# Patient Record
Sex: FEMALE | Race: ASIAN | NOT HISPANIC OR LATINO | URBAN - METROPOLITAN AREA
[De-identification: names, ages, dates, MRNs, and addresses within clinical notes are randomized per-mention and may not be internally consistent; named-entity substitution may affect disease eponyms.]

---

## 2022-08-25 ENCOUNTER — TELEPHONE (OUTPATIENT)
Dept: SCHEDULING | Facility: IMAGING CENTER | Age: 87
End: 2022-08-25

## 2022-10-19 ENCOUNTER — OFFICE VISIT (OUTPATIENT)
Dept: MEDICAL GROUP | Facility: PHYSICIAN GROUP | Age: 87
End: 2022-10-19
Payer: COMMERCIAL

## 2022-10-19 VITALS
SYSTOLIC BLOOD PRESSURE: 138 MMHG | BODY MASS INDEX: 32.17 KG/M2 | HEIGHT: 55 IN | TEMPERATURE: 97.4 F | DIASTOLIC BLOOD PRESSURE: 70 MMHG | OXYGEN SATURATION: 98 % | RESPIRATION RATE: 16 BRPM | HEART RATE: 75 BPM | WEIGHT: 139 LBS

## 2022-10-19 DIAGNOSIS — I10 ESSENTIAL HYPERTENSION: ICD-10-CM

## 2022-10-19 DIAGNOSIS — N18.4 CKD (CHRONIC KIDNEY DISEASE) STAGE 4, GFR 15-29 ML/MIN (HCC): ICD-10-CM

## 2022-10-19 DIAGNOSIS — R80.9 TYPE 2 DIABETES MELLITUS WITH MICROALBUMINURIA, WITHOUT LONG-TERM CURRENT USE OF INSULIN (HCC): ICD-10-CM

## 2022-10-19 DIAGNOSIS — E11.29 TYPE 2 DIABETES MELLITUS WITH MICROALBUMINURIA, WITHOUT LONG-TERM CURRENT USE OF INSULIN (HCC): ICD-10-CM

## 2022-10-19 DIAGNOSIS — E78.5 DYSLIPIDEMIA: ICD-10-CM

## 2022-10-19 PROCEDURE — 99204 OFFICE O/P NEW MOD 45 MIN: CPT | Performed by: NURSE PRACTITIONER

## 2022-10-19 RX ORDER — ATORVASTATIN CALCIUM 80 MG/1
80 TABLET, FILM COATED ORAL DAILY
COMMUNITY
Start: 2021-11-15 | End: 2022-12-14 | Stop reason: SDUPTHER

## 2022-10-19 RX ORDER — UBIDECARENONE 100 MG
CAPSULE ORAL
COMMUNITY

## 2022-10-19 RX ORDER — AMLODIPINE BESYLATE 10 MG/1
10 TABLET ORAL DAILY
COMMUNITY
Start: 2021-11-22 | End: 2023-03-16 | Stop reason: SDUPTHER

## 2022-10-19 RX ORDER — METOPROLOL SUCCINATE 50 MG/1
50 TABLET, EXTENDED RELEASE ORAL DAILY
COMMUNITY
Start: 2021-11-22 | End: 2022-11-30 | Stop reason: SDUPTHER

## 2022-10-19 RX ORDER — LOSARTAN POTASSIUM 25 MG/1
75 TABLET ORAL DAILY
COMMUNITY
Start: 2021-11-22 | End: 2022-12-06

## 2022-10-19 RX ORDER — GLIPIZIDE 5 MG/1
2.5 TABLET ORAL
COMMUNITY
Start: 2019-04-08 | End: 2022-11-16

## 2022-10-19 RX ORDER — CHOLECALCIFEROL (VITAMIN D3) 25 MCG
TABLET,CHEWABLE ORAL
COMMUNITY

## 2022-10-19 ASSESSMENT — PATIENT HEALTH QUESTIONNAIRE - PHQ9: CLINICAL INTERPRETATION OF PHQ2 SCORE: 0

## 2022-10-19 ASSESSMENT — FIBROSIS 4 INDEX: FIB4 SCORE: 1.41

## 2022-10-19 NOTE — ASSESSMENT & PLAN NOTE
Patient is 86-year-old female here to establish care with me today.  Taking atorvastatin 80 mg daily.  Denies history of stroke or heart disease.

## 2022-10-19 NOTE — ASSESSMENT & PLAN NOTE
Patient is 86-year-old female here to establish care with me today.  Patient reports stage IV chronic kidney disease.  Was seeing nephrology in [place] during the last 6 months that she lives there.  Last GFR is 19.  Scanned into chart.  Also able to review previous nephrology notes in care everywhere.  There was consideration of fistula for possible dialysis.  Patient reports she has been trying to follow a healthy kidney diet.  Requesting referral to nephrologist.

## 2022-10-19 NOTE — ASSESSMENT & PLAN NOTE
Patient is 86-year-old female here to establish care with me today.  Long history of type 2 diabetes with CKD.  Was taking glipizide 5 mg daily up until approximately 6 months ago.  Discontinued taking it because it was causing low blood sugar.  Patient tried taking half a tab, but would still get low blood sugars.  However, since discontinuing it, she has not been monitoring her blood sugar.  Denies any symptoms of hypoglycemia.  Denies polyuria, polydipsia, vision changes.  She brings in copy of most recent lab results from 5/2022, to be scanned into chart.  They show A1c of 6.7%.  Which is when she discontinued glipizide.

## 2022-10-19 NOTE — ASSESSMENT & PLAN NOTE
Patient is 86-year-old female here to establish care with me today.  Patient reports this is chronic health problem that she has had for few years.  Managing with losartan 25 mg daily and metoprolol SR 50 mg daily and amlodipine 10 mg daily.

## 2022-10-24 NOTE — PROGRESS NOTES
CC: Establish care, referral to nephrology    HISTORY OF THE PRESENT ILLNESS: Patient is a 86 y.o. female. This pleasant patient is here today to establish care and for evaluation and management of the following health problems.    Patient recently moved to [place] from [place] to be closer to her granddaughter who is an RN at [employer] and lives in [place].      Type 2 diabetes mellitus with microalbuminuria, without long-term current use of insulin ([facility])  Patient is 86-year-old female here to establish care with me today.  Long history of type 2 diabetes with CKD.  Was taking glipizide 5 mg daily up until approximately 6 months ago.  Discontinued taking it because it was causing low blood sugar.  Patient tried taking half a tab, but would still get low blood sugars.  However, since discontinuing it, she has not been monitoring her blood sugar.  Denies any symptoms of hypoglycemia.  Denies polyuria, polydipsia, vision changes.  She brings in copy of most recent lab results from 5/2022, to be scanned into chart.  They show A1c of 6.7%.  Which is when she discontinued glipizide.    Essential hypertension  Patient is 86-year-old female here to establish care with me today.  Patient reports this is chronic health problem that she has had for few years.  Managing with losartan 25 mg daily and metoprolol SR 50 mg daily and amlodipine 10 mg daily.      Dyslipidemia  Patient is 86-year-old female here to establish care with me today.  Taking atorvastatin 80 mg daily.  Denies history of stroke or heart disease.    CKD (chronic kidney disease) stage 4, GFR 15-29 ml/min ([facility])  Patient is 86-year-old female here to establish care with me today.  Patient reports stage IV chronic kidney disease.  Was seeing nephrology in [place] during the last 6 months that she lives there.  Last GFR is 19.  Scanned into chart.  Also able to review previous nephrology notes in care everywhere.  There was consideration of fistula for  possible dialysis.  Patient reports she has been trying to follow a healthy kidney diet.  Requesting referral to nephrologist.    Allergies: Patient has no known allergies.    Current Outpatient Medications Ordered in Epic   Medication Sig Dispense Refill    amLODIPine (NORVASC) 10 MG Tab Take 10 mg by mouth every day.      atorvastatin (LIPITOR) 80 MG tablet Take 80 mg by mouth every day.      losartan (COZAAR) 25 MG Tab Take 75 mg by mouth every day.      metoprolol SR (TOPROL XL) 50 MG TABLET SR 24 HR Take 50 mg by mouth every day.      BIOTIN 5000 PO Take  by mouth.      Cholecalciferol (D3-1000) 1000 UNIT Cap Take  by mouth.      Cyanocobalamin (B-12) 1000 MCG Cap Take  by mouth.      Omega-3 Fatty Acids (OMEGA 3 PO) Take  by mouth.      Multiple Vitamins-Minerals (WOMENS MULTIVITAMIN PO) Take  by mouth.      glipiZIDE (GLUCOTROL) 5 MG Tab Take 2.5 mg by mouth. (Patient not taking: Reported on 10/19/2022)       No current [facility]-ordered facility-administered medications on file.       Past Medical History:   Diagnosis Date    Arthritis     Blood transfusion without reported diagnosis     Cataract     Diabetes (HCC)     Kidney disease        Past Surgical History:   Procedure Laterality Date    BLADDER SLING FEMALE      CATARACT EXTRACTION WITH IOL         Social History     Tobacco Use    Smoking status: Former     Years: 20.00     Types: Cigarettes     Quit date: 1970     Years since quittin.8    Smokeless tobacco: Never   Vaping Use    Vaping Use: Never used   Substance Use Topics    Alcohol use: Not Currently    Drug use: Never       Family History   Problem Relation Age of Onset    Heart Disease Mother     Stroke Mother     Colon Cancer Father     Hypertension Sister     Heart Disease Brother        ROS:     - Constitutional: Negative for fever, chills, unexpected weight change, and fatigue/generalized weakness.     - HEENT: Negative for headaches, vision changes, hearing changes, ear pain, rhinorrhea,  "sinus congestion, and sore throat.      - Respiratory: Negative for cough, dyspnea, and wheezing.      - Cardiovascular: Negative for chest pain, palpitations, orthopnea, and bilateral lower extremity edema.     - Gastrointestinal: Negative for heartburn, nausea, vomiting, abdominal pain, hematochezia, melena, diarrhea, constipation.     - Genitourinary: Negative for dysuria, polyuria, hematuria, pyuria, urinary urgency.  Positive urinary incontinence.    - Musculoskeletal: Negative for myalgias, back pain, and joint pain.     - Skin: Negative for rash, cyanotic skin color change.  Positive itching    - Neurological: Negative for dizziness, tingling, tremors, focal sensory deficit, focal weakness and headaches.     - Endo/Heme/Allergies: Does not bruise/bleed easily.     - Psychiatric/Behavioral: Negative for depression, suicidal/homicidal ideation and memory loss.         Exam: /70 (BP Location: Left arm)   Pulse 75   Temp 36.3 °C (97.4 °F) (Temporal)   Resp 16   Ht 1.207 m (3' 11.5\")   Wt 63 kg (139 lb)   SpO2 98%  Body mass index is 43.31 kg/m².    General: Alert, pleasant, well nourished, well developed female in NAD  HEENT: Normocephalic. Eyes conjunctiva clear lids without ptosis, pupils equal and reactive to light, ears normal shape and contour, canals are clear bilaterally, tympanic membranes are pearly gray with good light reflex, nasal mucosa without erythema and drainage, oropharynx is without erythema, edema or exudates.   Neck: Supple without bruit. Thyroid is not enlarged.  Pulmonary: Clear to ausculation.  Normal effort. No rales, ronchi, or wheezing.  Cardiovascular: Normal rate and rhythm without murmur. Carotid and radial pulses are intact and equal bilaterally.  No lower extremity edema.  Abdomen: Soft, nontender, nondistended. Normal bowel sounds. Liver and spleen are not palpable  Neurologic: Grossly nonfocal  Lymph: No cervical or supraclavicular lymph nodes are palpable  Skin: Warm " and dry.    Musculoskeletal: Normal gait.   Psych: Normal mood and affect. Alert and oriented. Judgment and insight is normal.    Please note that this dictation was created using voice recognition software. I have made every reasonable attempt to correct obvious errors, but I expect that there are errors of grammar and possibly content that I did not discover before finalizing the note.      Assessment/Plan  1. CKD (chronic kidney disease) stage 4, GFR 15-29 ml/min ([facility])  Patient has stage IV CKD.  Needs to establish with nephrology.  Currently asymptomatic.  Will refer to nephrology and get updated labs.  Patient verbalizes understanding.  - Referral to Nephrology  - URINALYSIS; Future  - PROTEIN/CREAT RATIO URINE; Future    2. Type 2 diabetes mellitus with microalbuminuria, without long-term current use of insulin ([facility])  Uncertain of control.  We will get updated labs.  We will follow-up with patient in 2 weeks.  In the meantime, continue with diabetic diet.  - Comp Metabolic Panel; Future  - ESTIMATED GFR; Future  - HEMOGLOBIN A1C; Future  - MICROALBUMIN CREAT RATIO URINE; Future  - TSH WITH REFLEX TO FT4; Future  - CBC WITH DIFFERENTIAL; Future    3. Dyslipidemia  Uncertain of control.  We will get updated lipid profile.  In the meantime, continue with atorvastatin, no changes.  - Lipid Profile; Future    4. Essential hypertension  Well-controlled.  Continue with losartan, metoprolol, amlodipine, no changes.  Patient not needing refills today.  Advised on lifestyle measures.  - Comp Metabolic Panel; Future  - ESTIMATED GFR; Future  - CBC WITH DIFFERENTIAL; Future    Patient will return to clinic in 2 weeks for lab review and follow-up on chronic health problems or sooner if needed.

## 2022-11-09 ENCOUNTER — HOSPITAL ENCOUNTER (OUTPATIENT)
Dept: LAB | Facility: MEDICAL CENTER | Age: 87
End: 2022-11-09
Attending: NURSE PRACTITIONER
Payer: COMMERCIAL

## 2022-11-09 DIAGNOSIS — N18.4 CKD (CHRONIC KIDNEY DISEASE) STAGE 4, GFR 15-29 ML/MIN (HCC): ICD-10-CM

## 2022-11-09 DIAGNOSIS — E78.5 DYSLIPIDEMIA: ICD-10-CM

## 2022-11-09 DIAGNOSIS — E11.29 TYPE 2 DIABETES MELLITUS WITH MICROALBUMINURIA, WITHOUT LONG-TERM CURRENT USE OF INSULIN (HCC): ICD-10-CM

## 2022-11-09 DIAGNOSIS — I10 ESSENTIAL HYPERTENSION: ICD-10-CM

## 2022-11-09 DIAGNOSIS — R80.9 TYPE 2 DIABETES MELLITUS WITH MICROALBUMINURIA, WITHOUT LONG-TERM CURRENT USE OF INSULIN (HCC): ICD-10-CM

## 2022-11-09 LAB
ALBUMIN SERPL BCP-MCNC: 4 G/DL (ref 3.2–4.9)
ALBUMIN/GLOB SERPL: 1.2 G/DL
ALP SERPL-CCNC: 110 U/L (ref 30–99)
ALT SERPL-CCNC: 15 U/L (ref 2–50)
ANION GAP SERPL CALC-SCNC: 12 MMOL/L (ref 7–16)
APPEARANCE UR: ABNORMAL
AST SERPL-CCNC: 19 U/L (ref 12–45)
BACTERIA #/AREA URNS HPF: ABNORMAL /HPF
BASOPHILS # BLD AUTO: 0.7 % (ref 0–1.8)
BASOPHILS # BLD: 0.05 K/UL (ref 0–0.12)
BILIRUB SERPL-MCNC: 0.3 MG/DL (ref 0.1–1.5)
BILIRUB UR QL STRIP.AUTO: NEGATIVE
BUN SERPL-MCNC: 43 MG/DL (ref 8–22)
CALCIUM SERPL-MCNC: 9.5 MG/DL (ref 8.5–10.5)
CHLORIDE SERPL-SCNC: 105 MMOL/L (ref 96–112)
CHOLEST SERPL-MCNC: 186 MG/DL (ref 100–199)
CO2 SERPL-SCNC: 22 MMOL/L (ref 20–33)
COLOR UR: YELLOW
CREAT SERPL-MCNC: 2.37 MG/DL (ref 0.5–1.4)
CREAT UR-MCNC: 93.25 MG/DL
EOSINOPHIL # BLD AUTO: 0.17 K/UL (ref 0–0.51)
EOSINOPHIL NFR BLD: 2.4 % (ref 0–6.9)
EPI CELLS #/AREA URNS HPF: NEGATIVE /HPF
ERYTHROCYTE [DISTWIDTH] IN BLOOD BY AUTOMATED COUNT: 40.4 FL (ref 35.9–50)
EST. AVERAGE GLUCOSE BLD GHB EST-MCNC: 197 MG/DL
FASTING STATUS PATIENT QL REPORTED: NORMAL
GFR SERPLBLD CREATININE-BSD FMLA CKD-EPI: 20 ML/MIN/1.73 M 2
GLOBULIN SER CALC-MCNC: 3.3 G/DL (ref 1.9–3.5)
GLUCOSE SERPL-MCNC: 161 MG/DL (ref 65–99)
GLUCOSE UR STRIP.AUTO-MCNC: 100 MG/DL
HBA1C MFR BLD: 8.5 % (ref 4–5.6)
HCT VFR BLD AUTO: 35.1 % (ref 37–47)
HDLC SERPL-MCNC: 60 MG/DL
HGB BLD-MCNC: 11.6 G/DL (ref 12–16)
HYALINE CASTS #/AREA URNS LPF: ABNORMAL /LPF
IMM GRANULOCYTES # BLD AUTO: 0.06 K/UL (ref 0–0.11)
IMM GRANULOCYTES NFR BLD AUTO: 0.8 % (ref 0–0.9)
KETONES UR STRIP.AUTO-MCNC: NEGATIVE MG/DL
LDLC SERPL CALC-MCNC: 99 MG/DL
LEUKOCYTE ESTERASE UR QL STRIP.AUTO: ABNORMAL
LYMPHOCYTES # BLD AUTO: 1.89 K/UL (ref 1–4.8)
LYMPHOCYTES NFR BLD: 26.6 % (ref 22–41)
MCH RBC QN AUTO: 29.9 PG (ref 27–33)
MCHC RBC AUTO-ENTMCNC: 33 G/DL (ref 33.6–35)
MCV RBC AUTO: 90.5 FL (ref 81.4–97.8)
MICRO URNS: ABNORMAL
MICROALBUMIN UR-MCNC: >440 MG/DL
MICROALBUMIN/CREAT UR: NORMAL MG/G (ref 0–30)
MONOCYTES # BLD AUTO: 0.94 K/UL (ref 0–0.85)
MONOCYTES NFR BLD AUTO: 13.2 % (ref 0–13.4)
NEUTROPHILS # BLD AUTO: 3.99 K/UL (ref 2–7.15)
NEUTROPHILS NFR BLD: 56.3 % (ref 44–72)
NITRITE UR QL STRIP.AUTO: POSITIVE
NRBC # BLD AUTO: 0 K/UL
NRBC BLD-RTO: 0 /100 WBC
PH UR STRIP.AUTO: 6.5 [PH] (ref 5–8)
PLATELET # BLD AUTO: 302 K/UL (ref 164–446)
PMV BLD AUTO: 9 FL (ref 9–12.9)
POTASSIUM SERPL-SCNC: 4.4 MMOL/L (ref 3.6–5.5)
PROT SERPL-MCNC: 7.3 G/DL (ref 6–8.2)
PROT UR QL STRIP: >=1000 MG/DL
RBC # BLD AUTO: 3.88 M/UL (ref 4.2–5.4)
RBC # URNS HPF: ABNORMAL /HPF
RBC UR QL AUTO: ABNORMAL
SODIUM SERPL-SCNC: 139 MMOL/L (ref 135–145)
SP GR UR STRIP.AUTO: 1.02
TRIGL SERPL-MCNC: 137 MG/DL (ref 0–149)
TSH SERPL DL<=0.005 MIU/L-ACNC: 0.72 UIU/ML (ref 0.38–5.33)
UROBILINOGEN UR STRIP.AUTO-MCNC: 0.2 MG/DL
WBC # BLD AUTO: 7.1 K/UL (ref 4.8–10.8)
WBC #/AREA URNS HPF: ABNORMAL /HPF

## 2022-11-09 PROCEDURE — 36415 COLL VENOUS BLD VENIPUNCTURE: CPT

## 2022-11-09 PROCEDURE — 85025 COMPLETE CBC W/AUTO DIFF WBC: CPT

## 2022-11-09 PROCEDURE — 84156 ASSAY OF PROTEIN URINE: CPT

## 2022-11-09 PROCEDURE — 80061 LIPID PANEL: CPT

## 2022-11-09 PROCEDURE — 83036 HEMOGLOBIN GLYCOSYLATED A1C: CPT

## 2022-11-09 PROCEDURE — 84443 ASSAY THYROID STIM HORMONE: CPT

## 2022-11-09 PROCEDURE — 82043 UR ALBUMIN QUANTITATIVE: CPT

## 2022-11-09 PROCEDURE — 80053 COMPREHEN METABOLIC PANEL: CPT

## 2022-11-09 PROCEDURE — 81001 URINALYSIS AUTO W/SCOPE: CPT

## 2022-11-09 PROCEDURE — 82570 ASSAY OF URINE CREATININE: CPT

## 2022-11-10 ENCOUNTER — TELEPHONE (OUTPATIENT)
Dept: MEDICAL GROUP | Facility: PHYSICIAN GROUP | Age: 87
End: 2022-11-10
Payer: COMMERCIAL

## 2022-11-10 DIAGNOSIS — N30.00 ACUTE CYSTITIS WITHOUT HEMATURIA: ICD-10-CM

## 2022-11-10 LAB
CREAT UR-MCNC: 92.72 MG/DL
PROT UR-MCNC: >600 MG/DL (ref 0–15)
PROT/CREAT UR: ABNORMAL MG/G (ref 10–107)

## 2022-11-10 RX ORDER — SULFAMETHOXAZOLE AND TRIMETHOPRIM 400; 80 MG/1; MG/1
1 TABLET ORAL 2 TIMES DAILY
Qty: 10 TABLET | Refills: 0 | Status: SHIPPED | OUTPATIENT
Start: 2022-11-10 | End: 2022-11-16

## 2022-11-16 ENCOUNTER — OFFICE VISIT (OUTPATIENT)
Dept: MEDICAL GROUP | Facility: PHYSICIAN GROUP | Age: 87
End: 2022-11-16
Payer: COMMERCIAL

## 2022-11-16 VITALS
OXYGEN SATURATION: 98 % | TEMPERATURE: 97.2 F | DIASTOLIC BLOOD PRESSURE: 70 MMHG | RESPIRATION RATE: 16 BRPM | SYSTOLIC BLOOD PRESSURE: 120 MMHG | BODY MASS INDEX: 25.58 KG/M2 | HEIGHT: 60 IN | WEIGHT: 130.3 LBS | HEART RATE: 75 BPM

## 2022-11-16 DIAGNOSIS — M65.332 TRIGGER MIDDLE FINGER OF LEFT HAND: ICD-10-CM

## 2022-11-16 DIAGNOSIS — N18.4 CKD (CHRONIC KIDNEY DISEASE) STAGE 4, GFR 15-29 ML/MIN (HCC): ICD-10-CM

## 2022-11-16 DIAGNOSIS — M54.50 CHRONIC MIDLINE LOW BACK PAIN WITHOUT SCIATICA: ICD-10-CM

## 2022-11-16 DIAGNOSIS — G89.29 CHRONIC MIDLINE LOW BACK PAIN WITHOUT SCIATICA: ICD-10-CM

## 2022-11-16 DIAGNOSIS — R80.9 TYPE 2 DIABETES MELLITUS WITH MICROALBUMINURIA, WITHOUT LONG-TERM CURRENT USE OF INSULIN (HCC): ICD-10-CM

## 2022-11-16 DIAGNOSIS — E11.29 TYPE 2 DIABETES MELLITUS WITH MICROALBUMINURIA, WITHOUT LONG-TERM CURRENT USE OF INSULIN (HCC): ICD-10-CM

## 2022-11-16 DIAGNOSIS — R01.1 HEART MURMUR: ICD-10-CM

## 2022-11-16 PROCEDURE — 99214 OFFICE O/P EST MOD 30 MIN: CPT | Performed by: NURSE PRACTITIONER

## 2022-11-16 RX ORDER — GLIPIZIDE 2.5 MG/1
2.5 TABLET, EXTENDED RELEASE ORAL DAILY
Qty: 30 TABLET | Refills: 2 | Status: SHIPPED | OUTPATIENT
Start: 2022-11-16 | End: 2022-12-06

## 2022-11-16 ASSESSMENT — FIBROSIS 4 INDEX: FIB4 SCORE: 1.36

## 2022-11-16 NOTE — ASSESSMENT & PLAN NOTE
Chronic health problem, uncontrolled.  Patient had discontinued glipizide 5 mg daily several months ago secondary to episodes of hypoglycemia.  She does admit to not being diligent about diabetic diet.  A1c is 8.6%.  Denies polydipsia, polyuria.  Started taking glipizide 2.5 mg immediate release a week ago after she saw the results.  Has been tolerating.  On ARB and statin.

## 2022-11-16 NOTE — ASSESSMENT & PLAN NOTE
Patient reports chronic low back pain.  Seems to worsen in the winter months when it is cold.  Denies leg pain.  Wonders if I have any suggestions for management.

## 2022-11-16 NOTE — PATIENT INSTRUCTIONS
For low back pain, try heating pad a couple times a day and/or over-the-counter Voltaren gel topically.  Try gentle stretching.

## 2022-11-16 NOTE — ASSESSMENT & PLAN NOTE
Patient reports left middle finger pain during the cold weather.  Sometimes gets stuck and unable to release it massages it.  Denies injury, numbness and tingling

## 2022-11-16 NOTE — ASSESSMENT & PLAN NOTE
Chronic problem.  GFR mildly improved to 20, serum creatinine 2.37.  Patient has appointment tomorrow to establish care with nephrology, Dr. Green.  Making clear urine, denies edema.  Not taking any NSAIDs.  Taking losartan.  Patient has symptomatic UTI.  Symptoms have resolved with Bactrim

## 2022-11-16 NOTE — PROGRESS NOTES
CC: Lab review    HISTORY OF THE PRESENT ILLNESS: Patient is a 86 y.o. female. This pleasant patient is here today for evaluation and management of the following health problems.        Type 2 diabetes mellitus with microalbuminuria, without long-term current use of insulin ([facility])  Chronic health problem, uncontrolled.  Patient had discontinued glipizide 5 mg daily several months ago secondary to episodes of hypoglycemia.  She does admit to not being diligent about diabetic diet.  A1c is 8.6%.  Denies polydipsia, polyuria.  Started taking glipizide 2.5 mg immediate release a week ago after she saw the results.  Has been tolerating.  On ARB and statin.    CKD (chronic kidney disease) stage 4, GFR 15-29 ml/min ([facility])  Chronic problem.  GFR mildly improved to 20, serum creatinine 2.37.  Patient has appointment tomorrow to establish care with nephrology, Dr. Green.  Making clear urine, denies edema.  Not taking any NSAIDs.  Taking losartan.  Patient has symptomatic UTI.  Symptoms have resolved with Bactrim    Chronic midline low back pain without sciatica  Patient reports chronic low back pain.  Seems to worsen in the winter months when it is cold.  Denies leg pain.  Wonders if I have any suggestions for management.    Trigger middle finger of left hand  Patient reports left middle finger pain during the cold weather.  Sometimes gets stuck and unable to release it massages it.  Denies injury, numbness and tingling    Heart murmur  Incidental finding on exam today.  Denies chest pain or shortness of breath.    Allergies: Patient has no known allergies.    Current Outpatient Medications Ordered in Epic   Medication Sig Dispense Refill    glipiZIDE SR (GLUCOTROL) 2.5 MG TABLET SR 24 HR Take 1 Tablet by mouth every day. 30 Tablet 2    amLODIPine (NORVASC) 10 MG Tab Take 1 Tablet by mouth every day.      atorvastatin (LIPITOR) 80 MG tablet Take 1 Tablet by mouth every day.      losartan (COZAAR) 25 MG Tab Take 3 Tablets by  "mouth every day.      metoprolol SR (TOPROL XL) 50 MG TABLET SR 24 HR Take 1 Tablet by mouth every day.      BIOTIN 5000 PO Take  by mouth.      Cholecalciferol (D3-1000) 1000 UNIT Cap Take  by mouth.      Cyanocobalamin (B-12) 1000 MCG Cap Take  by mouth.      Omega-3 Fatty Acids (OMEGA 3 PO) Take  by mouth.      Multiple Vitamins-Minerals (WOMENS MULTIVITAMIN PO) Take  by mouth.       No current Epic-ordered facility-administered medications on file.       Past Medical History:   Diagnosis Date    Arthritis     Blood transfusion without reported diagnosis     Cataract     Diabetes (HCC)     Kidney disease        Past Surgical History:   Procedure Laterality Date    BLADDER SLING FEMALE      CATARACT EXTRACTION WITH IOL         Social History     Tobacco Use    Smoking status: Former     Years: 20.00     Types: Cigarettes     Quit date:      Years since quittin.9    Smokeless tobacco: Never   Vaping Use    Vaping Use: Never used   Substance Use Topics    Alcohol use: Not Currently    Drug use: Never       Family History   Problem Relation Age of Onset    Heart Disease Mother     Stroke Mother     Colon Cancer Father     Hypertension Sister     Heart Disease Brother        ROS: As in HPI, otherwise negative for chest pain, dyspnea, abdominal pain, dysuria, blood in stool, fever         Exam: /70 (BP Location: Left arm)   Pulse 75   Temp 36.2 °C (97.2 °F) (Temporal)   Resp 16   Ht 1.511 m (4' 11.5\")   Wt 59.1 kg (130 lb 4.8 oz)   SpO2 98%  Body mass index is 25.88 kg/m².    General: Alert, pleasant, well nourished, well developed female in NAD  Neck: Supple without bruit. Thyroid is not enlarged.  Pulmonary: Clear to ausculation.  Normal effort. No rales, ronchi, or wheezing.  Cardiovascular: Normal rate and rhythm with 2/6 murmur. Carotid and radial pulses are intact and equal bilaterally.  No lower extremity edema.  Neurologic: Grossly nonfocal  Skin: Warm and dry.    Musculoskeletal: Normal " gait.  Lower back without erythema or edema, nontender to palpation.  Right middle finger without erythema or edema, full active range of motion, nontender to palpation  Psych: Normal mood and affect. Alert and oriented. Judgment and insight is normal.    Diabetic Foot Exam: No ulcers or skin lesions present, patient tested with a 10 g force and is sensitive bilaterally throughout the ball of the foot, great toe and heel.  Dorsalis pedis and posterior tibial pulses are present and intact bilaterally    Component      Latest Ref Rn 11/9/2022  8:57 AM   WBC      4.8 - 10.8 K/uL 7.1    RBC      4.20 - 5.40 M/uL 3.88 (L)    Hemoglobin      12.0 - 16.0 g/dL 11.6 (L)    Hematocrit      37.0 - 47.0 % 35.1 (L)    MCV      81.4 - 97.8 fL 90.5    MCH      27.0 - 33.0 pg 29.9    MCHC      33.6 - 35.0 g/dL 33.0 (L)    RDW      35.9 - 50.0 fL 40.4    Platelet Count      164 - 446 K/uL 302    MPV      9.0 - 12.9 fL 9.0    Neutrophils-Polys      44.00 - 72.00 % 56.30    Lymphocytes      22.00 - 41.00 % 26.60    Monocytes      0.00 - 13.40 % 13.20    Eosinophils      0.00 - 6.90 % 2.40    Basophils      0.00 - 1.80 % 0.70    Immature Granulocytes      0.00 - 0.90 % 0.80    Nucleated RBC      /100 WBC 0.00    Neutrophils (Absolute)      2.00 - 7.15 K/uL 3.99    Lymphs (Absolute)      1.00 - 4.80 K/uL 1.89    Monos (Absolute)      0.00 - 0.85 K/uL 0.94 (H)    Eos (Absolute)      0.00 - 0.51 K/uL 0.17    Baso (Absolute)      0.00 - 0.12 K/uL 0.05    Immature Granulocytes (abs)      0.00 - 0.11 K/uL 0.06    NRBC (Absolute)      K/uL 0.00    Sodium      135 - 145 mmol/L 139    Potassium      3.6 - 5.5 mmol/L 4.4    Chloride      96 - 112 mmol/L 105    Co2      20 - 33 mmol/L 22    Anion Gap      7.0 - 16.0  12.0    Glucose      65 - 99 mg/dL 161 (H)    Bun      8 - 22 mg/dL 43 (H)    Creatinine      0.50 - 1.40 mg/dL 2.37 (H)    Calcium      8.5 - 10.5 mg/dL 9.5    AST(SGOT)      12 - 45 U/L 19    ALT(SGPT)      2 - 50 U/L 15     Alkaline Phosphatase      30 - 99 U/L 110 (H)    Total Bilirubin      0.1 - 1.5 mg/dL 0.3    Albumin      3.2 - 4.9 g/dL 4.0    Total Protein      6.0 - 8.2 g/dL 7.3    Globulin      1.9 - 3.5 g/dL 3.3    A-G Ratio      g/dL 1.2    Cholesterol,Tot      100 - 199 mg/dL 186    Triglycerides      0 - 149 mg/dL 137    HDL      >=40 mg/dL 60    LDL      <100 mg/dL 99    Creatinine, Urine      mg/dL    Microalbumin, Urine Random      mg/dL    Micro Alb Creat Ratio      0 - 30 mg/g    Glycohemoglobin      4.0 - 5.6 % 8.5 (H)    Estim. Avg Glu      mg/dL 197    TSH      0.380 - 5.330 uIU/mL 0.720    GFR (CKD-EPI)      >60 mL/min/1.73 m 2 20 !           Please note that this dictation was created using voice recognition software. I have made every reasonable attempt to correct obvious errors, but I expect that there are errors of grammar and possibly content that I did not discover before finalizing the note.      Assessment/Plan  1. Type 2 diabetes mellitus with microalbuminuria, without long-term current use of insulin (HCC)  Uncontrolled.  Has been tolerating immediate release glipizide for the last week.  However, would be better covered with glipizide SR.  Patient will continue immediate release and start sustained-release glipizide.  We will also wait for further recommendations from nephrology.  Due for retinal scan.  No abnormalities on foot exam today.  - glipiZIDE SR (GLUCOTROL) 2.5 MG TABLET SR 24 HR; Take 1 Tablet by mouth every day.  Dispense: 30 Tablet; Refill: 2  - POCT Retinal Eye Exam  - Comp Metabolic Panel; Future  - ESTIMATED GFR; Future  - HEMOGLOBIN A1C; Future    2. CKD (chronic kidney disease) stage 4, GFR 15-29 ml/min (HCC)  Stable.  Asymptomatic.  Has appointment to establish care with nephrology tomorrow.    3. Chronic midline low back pain without sciatica  For low back pain, try heating pad a couple times a day and/or over-the-counter Voltaren gel topically.  Try gentle stretching.    4.  Trigger middle finger of left hand  Reviewed with patient she likely has trigger finger.  Handout on how to manage given to patient today.  If not helpful, consider referral to hand specialist for consideration of steroid injection.    5. Heart murmur  New finding.  Asymptomatic.  Consider echo.    Patient will return to clinic in 3 months for diabetes and lab review or sooner if needed.

## 2022-11-18 ENCOUNTER — APPOINTMENT (OUTPATIENT)
Dept: NEPHROLOGY | Facility: MEDICAL CENTER | Age: 87
End: 2022-11-18
Payer: COMMERCIAL

## 2022-11-29 ENCOUNTER — APPOINTMENT (OUTPATIENT)
Dept: NEPHROLOGY | Facility: MEDICAL CENTER | Age: 87
End: 2022-11-29
Payer: COMMERCIAL

## 2022-11-30 DIAGNOSIS — I10 ESSENTIAL HYPERTENSION: ICD-10-CM

## 2022-11-30 RX ORDER — METOPROLOL SUCCINATE 50 MG/1
50 TABLET, EXTENDED RELEASE ORAL DAILY
Qty: 90 TABLET | Refills: 3 | Status: SHIPPED | OUTPATIENT
Start: 2022-11-30 | End: 2023-07-27 | Stop reason: SDUPTHER

## 2022-11-30 NOTE — TELEPHONE ENCOUNTER
Received request via: Patient    Was the patient seen in the last year in this department? Yes    Does the patient have an active prescription (recently filled or refills available) for medication(s) requested? No    Does the patient have [facility] and need 100 day supply (blood pressure, diabetes and cholesterol meds only)? Patient does not have SCP    Last ov 11/16/22

## 2022-12-06 ENCOUNTER — TELEMEDICINE2 (OUTPATIENT)
Dept: NEPHROLOGY | Facility: MEDICAL CENTER | Age: 87
End: 2022-12-06
Payer: COMMERCIAL

## 2022-12-06 VITALS
HEIGHT: 60 IN | DIASTOLIC BLOOD PRESSURE: 80 MMHG | RESPIRATION RATE: 16 BRPM | OXYGEN SATURATION: 98 % | BODY MASS INDEX: 25.52 KG/M2 | TEMPERATURE: 97.4 F | WEIGHT: 130 LBS | HEART RATE: 83 BPM | SYSTOLIC BLOOD PRESSURE: 140 MMHG

## 2022-12-06 DIAGNOSIS — E21.3 HYPERPARATHYROIDISM (HCC): ICD-10-CM

## 2022-12-06 DIAGNOSIS — E11.21 TYPE 2 DIABETES MELLITUS WITH DIABETIC NEPHROPATHY, WITHOUT LONG-TERM CURRENT USE OF INSULIN (HCC): ICD-10-CM

## 2022-12-06 DIAGNOSIS — I10 ESSENTIAL HYPERTENSION: ICD-10-CM

## 2022-12-06 DIAGNOSIS — N18.4 CKD (CHRONIC KIDNEY DISEASE) STAGE 4, GFR 15-29 ML/MIN (HCC): ICD-10-CM

## 2022-12-06 DIAGNOSIS — D64.9 ANEMIA, UNSPECIFIED TYPE: ICD-10-CM

## 2022-12-06 PROCEDURE — 99204 OFFICE O/P NEW MOD 45 MIN: CPT | Mod: 95 | Performed by: INTERNAL MEDICINE

## 2022-12-06 RX ORDER — LOSARTAN POTASSIUM 100 MG/1
100 TABLET ORAL DAILY
Qty: 90 TABLET | Refills: 3 | Status: SHIPPED | OUTPATIENT
Start: 2022-12-06 | End: 2023-02-28 | Stop reason: SDUPTHER

## 2022-12-06 RX ORDER — DAPAGLIFLOZIN 5 MG/1
5 TABLET, FILM COATED ORAL DAILY
Qty: 90 TABLET | Refills: 3 | Status: SHIPPED | OUTPATIENT
Start: 2022-12-06 | End: 2023-03-15 | Stop reason: SDUPTHER

## 2022-12-06 ASSESSMENT — FIBROSIS 4 INDEX: FIB4 SCORE: 1.36

## 2022-12-06 ASSESSMENT — ENCOUNTER SYMPTOMS
FEVER: 0
ABDOMINAL PAIN: 0
SHORTNESS OF BREATH: 0

## 2022-12-06 NOTE — PROGRESS NOTES
Telemedicine Note     Please note that I could not evaluate the patient in person at the site. All examination and evaluation of the patient and information gathering from the patient and/or the family were done jointly by the requesting physician, CLINT Burns at the site and me via licensed and securely encrypted tele-video communication equipment with the assistance of a trained tele-presenter at the originating site.  As such, my assessments and recommendations are limited as far as such telecommnication allows.     Consulting MD: Franko Green M.D.    Consulting MD location: [place], [place]  Requesting Physician: CLINT Burns   Patient name:      Ivette Castelan  :                    1935   MRN:                   [id number]     Data source:  Patient   Video Time:         30 minutes.   Originating site: [place], [place]  Originating site MA: not available at time of interview     Date of Service: 2022     Chief Complaint:   Chief Complaint   Patient presents with    New Patient     CKD (chronic kidney disease) stage 4, GFR 15-29 ml/min ([facility])        History of Present Illness:    Ivette Castelan is a 86 y.o. female with DM2, HTN, CKD4 who presents today to establish nephrology care.    She moved from [place] in  to [place], [place] to be closer to [facility].     Re: DM2, diagnosed  when she was overweight. Patient has had microalbuminuria since at least , and macroalbuminuria since 2016. Patient has seen an eye doctor and does not have retinopathy. She has had periods of good control of DM and some periods of poor control. She doesn't take glipizide all the time because she gets low sugars.     Re: HTN, diagnosed around . BP control over the years has been fairly controlled. She lost her BP monitor in the move.     Re: CKD, she has been seeing a nephrologist in [place], [place] since . She had 3 pregnancies without pre-eclampsia or DM. She denies history of RYAN, kidney stone. She  denies bladder troubles, but is incontinent. She denies NSAIDs.            ROS:    Review of Systems   Constitutional:  Negative for fever.   Respiratory:  Negative for shortness of breath.    Cardiovascular:  Negative for chest pain and leg swelling.   Gastrointestinal:  Negative for abdominal pain.   Genitourinary:  Negative for dysuria.   All other systems reviewed and are negative.                Past Medical History:  Past Medical History:   Diagnosis Date    Arthritis     Blood transfusion without reported diagnosis     Cataract     Diabetes (HCC)     Kidney disease      Past Surgical History:   Procedure Laterality Date    BLADDER SLING FEMALE      CATARACT EXTRACTION WITH IOL          Current Outpatient Medications   Medication Sig Dispense Refill    metoprolol SR (TOPROL XL) 50 MG TABLET SR 24 HR Take 1 Tablet by mouth every day. 90 Tablet 3    glipiZIDE SR (GLUCOTROL) 2.5 MG TABLET SR 24 HR Take 1 Tablet by mouth every day. 30 Tablet 2    amLODIPine (NORVASC) 10 MG Tab Take 1 Tablet by mouth every day.      atorvastatin (LIPITOR) 80 MG tablet Take 1 Tablet by mouth every day.      losartan (COZAAR) 25 MG Tab Take 3 Tablets by mouth every day.      BIOTIN 5000 PO Take  by mouth.      Cholecalciferol (D3-1000) 1000 UNIT Cap Take  by mouth.      Cyanocobalamin (B-12) 1000 MCG Cap Take  by mouth.      Omega-3 Fatty Acids (OMEGA 3 PO) Take  by mouth.      Multiple Vitamins-Minerals (WOMENS MULTIVITAMIN PO) Take  by mouth.       No current facility-administered medications for this visit.        Social History:  Social History     Socioeconomic History    Marital status:      Spouse name: Not on file    Number of children: Not on file    Years of education: Not on file    Highest education level: Not on file   Occupational History    Not on file   Tobacco Use    Smoking status: Former     Years: 20.00     Types: Cigarettes     Quit date:      Years since quittin.9    Smokeless tobacco: Never    Vaping Use    Vaping Use: Never used   Substance and Sexual Activity    Alcohol use: Not Currently    Drug use: Never    Sexual activity: Not Currently     Partners: Male     Comment:     Other Topics Concern    Not on file   Social History Narrative    Not on file     Social Determinants of Health     Financial Resource Strain: Not on file   Food Insecurity: Not on file   Transportation Needs: Not on file   Physical Activity: Not on file   Stress: Not on file   Social Connections: Not on file   Intimate Partner Violence: Not on file   Housing Stability: Not on file        Family History:  Family History   Problem Relation Age of Onset    Heart Disease Mother     Stroke Mother     Colon Cancer Father     Hypertension Sister     Heart Disease Brother     Kidney Disease Neg Hx         Allergies:   No Known Allergies     Current Outpatient Medications:   (Not in a hospital admission)          Physical Exam:   Vitals:   Vitals:    22 1058   BP: (!) 140/80   BP Location: Left arm   Patient Position: Sitting   BP Cuff Size: Adult   Pulse: 83   Resp: 16   Temp: 36.3 °C (97.4 °F)   TempSrc: Temporal   SpO2: 98%   Weight: 59 kg (130 lb)   Height: 1.524 m (5')       GEN: comfortable, in NAD   HEENT: Pupils unable to check.  Hearings appears normal to finger rubs b/l. Unable to check TMs. Oropharynx or nares could not be checked reliably.   NECK: appears supple, unable to check for thyroid or lymphadenopathy.  CV: skin pink and well perfused   PULM: no increased work of breathing, no wheezes.  ABD: appears soft, nontender, not distended.  Ext: unable to check but RN reports 2+ pedal pulses.  SKIN: No obvious rash or lesion noted.   Psychiatric: normal mood and affect, alert and oriented to self, place, persons and time.           Imaging reviewed & Visualized by me:  DIAGNOSTIC IMAGING REVIEWED AND/OR INTERPRETED BY ME:        Laboratory:   Recent Labs     22  1456 22  1521 22  0857    ALBUMIN 4.2 4.1 4.0   HDL  --   --  60   TRIGLYCERIDE  --   --  137   SODIUM 139 140 139   POTASSIUM 4.9 4.8 4.4   CHLORIDE 106 108 105   CO2 24 22* 22   BUN 63* 55* 43*   CREATININE 2.45*  --  2.37*   PHOSPHORUS 4.7* 4.4  --      Lab Results   Component Value Date/Time    WBC 7.1 11/09/2022 08:57 AM    RBC 3.88 (L) 11/09/2022 08:57 AM    HEMOGLOBIN 11.6 (L) 11/09/2022 08:57 AM    HEMATOCRIT 35.1 (L) 11/09/2022 08:57 AM    MCV 90.5 11/09/2022 08:57 AM    MCH 29.9 11/09/2022 08:57 AM    MCHC 33.0 (L) 11/09/2022 08:57 AM    MPV 9.0 11/09/2022 08:57 AM        URINALYSIS:  Lab Results   Component Value Date/Time    COLORURINE Yellow 11/09/2022 0857    CLARITY Turbid (A) 11/09/2022 0857    SPECGRAVITY 1.020 11/09/2022 0857    PHURINE 6.5 11/09/2022 0857    KETONES Negative 11/09/2022 0857    PROTEINURIN >=1000 (A) 11/09/2022 0857    BILIRUBINUR Negative 11/09/2022 0857    UROBILU 0.2 11/09/2022 0857    NITRITE Positive (A) 11/09/2022 0857    LEUKESTERAS Large (A) 11/09/2022 0857    OCCULTBLOOD Moderate (A) 11/09/2022 0857       UPC  Lab Results   Component Value Date/Time    TOTPROTUR >600.0 (H) 11/09/2022 0857      Lab Results   Component Value Date/Time    CREATININEU 92.72 11/09/2022 0857         [unfilled]       Assessment and Plan:   Ivette Castelan is a 86 y.o. female with DM2, HTN, CKD4 who presents today to establish nephrology care.     1. CKD (chronic kidney disease) stage 4, GFR 15-29 ml/min (HCC)  - Underlying CKD likely from diabetic nephropathy, hypertension, age-related kidney decline.  Patient has had stable CKD stage IV since at least 2021.  I recommend avoid NSAIDs and other nephrotoxins.  I recommend a low-salt diet.  I explained the importance of blood pressure and glycemic control to help slow CKD progression.  Maintain losartan for long-term kidney protection.  Start SGLT2 inhibitor.  Patient is at high risk of CKD progression to ESRD given her macroalbuminuria.    2. Type 2 diabetes mellitus with  diabetic nephropathy, without long-term current use of insulin (HCC)  -Uncontrolled.  Patient complains of hypoglycemia with glipizide.  She is unable to take metformin due to GFR less than 30.  Prescribe SGLT2 inhibitor, Farxiga 5 mg p.o. daily.  Defer further diabetes management to primary care.    3. Essential hypertension  -Uncontrolled.  Increase losartan from 50 to 100 mg p.o. daily.  If hypertension remains uncontrolled, I would recommend starting Lasix 80 mg p.o. daily.    4. Anemia, unspecified type  -Mild.  Check iron studies with next labs.  No acute need for Epogen with hemoglobin over 10.           Return to clinic 3 months with pre-clinic      I spent total of 30 minutes on face-to-face via telemonitor more than half of which was spent coordinating this with patient as well as reviewing the images and labs results with the patient, and answering all questions and explaining in details the assessment and recommendation sections above. The patient expressed their understanding and agreement to the above.        This EM service was conducted utilizing secure and encrypted videoconferencing equipment with the assistance of a trained tele-presenter at the originating site.       Franko Green MD  Nephrology  [facility]

## 2022-12-07 ENCOUNTER — HOSPITAL ENCOUNTER (OUTPATIENT)
Dept: LAB | Facility: MEDICAL CENTER | Age: 87
End: 2022-12-07
Attending: INTERNAL MEDICINE
Payer: COMMERCIAL

## 2022-12-07 DIAGNOSIS — E11.21 TYPE 2 DIABETES MELLITUS WITH DIABETIC NEPHROPATHY, WITHOUT LONG-TERM CURRENT USE OF INSULIN (HCC): ICD-10-CM

## 2022-12-07 DIAGNOSIS — D64.9 ANEMIA, UNSPECIFIED TYPE: ICD-10-CM

## 2022-12-07 DIAGNOSIS — N18.4 CKD (CHRONIC KIDNEY DISEASE) STAGE 4, GFR 15-29 ML/MIN (HCC): ICD-10-CM

## 2022-12-07 DIAGNOSIS — E21.3 HYPERPARATHYROIDISM (HCC): ICD-10-CM

## 2022-12-07 LAB
25(OH)D3 SERPL-MCNC: 27 NG/ML (ref 30–100)
ALBUMIN SERPL BCP-MCNC: 4.2 G/DL (ref 3.2–4.9)
ANION GAP SERPL CALC-SCNC: 10 MMOL/L (ref 7–16)
APPEARANCE UR: CLEAR
BACTERIA #/AREA URNS HPF: NEGATIVE /HPF
BILIRUB UR QL STRIP.AUTO: NEGATIVE
BUN SERPL-MCNC: 52 MG/DL (ref 8–22)
CALCIUM SERPL-MCNC: 9.6 MG/DL (ref 8.5–10.5)
CHLORIDE SERPL-SCNC: 104 MMOL/L (ref 96–112)
CO2 SERPL-SCNC: 24 MMOL/L (ref 20–33)
COLOR UR: YELLOW
CREAT SERPL-MCNC: 2.65 MG/DL (ref 0.5–1.4)
CREAT UR-MCNC: 28.92 MG/DL
EPI CELLS #/AREA URNS HPF: NEGATIVE /HPF
ERYTHROCYTE [DISTWIDTH] IN BLOOD BY AUTOMATED COUNT: 43.5 FL (ref 35.9–50)
FASTING STATUS PATIENT QL REPORTED: NORMAL
FERRITIN SERPL-MCNC: 270 NG/ML (ref 10–291)
GFR SERPLBLD CREATININE-BSD FMLA CKD-EPI: 17 ML/MIN/1.73 M 2
GLUCOSE SERPL-MCNC: 158 MG/DL (ref 65–99)
GLUCOSE UR STRIP.AUTO-MCNC: NEGATIVE MG/DL
HCT VFR BLD AUTO: 34.4 % (ref 37–47)
HGB BLD-MCNC: 11.1 G/DL (ref 12–16)
HYALINE CASTS #/AREA URNS LPF: NORMAL /LPF
IRON SATN MFR SERPL: 31 % (ref 15–55)
IRON SERPL-MCNC: 80 UG/DL (ref 40–170)
KETONES UR STRIP.AUTO-MCNC: NEGATIVE MG/DL
LEUKOCYTE ESTERASE UR QL STRIP.AUTO: NEGATIVE
MCH RBC QN AUTO: 30.4 PG (ref 27–33)
MCHC RBC AUTO-ENTMCNC: 32.3 G/DL (ref 33.6–35)
MCV RBC AUTO: 94.2 FL (ref 81.4–97.8)
MICRO URNS: ABNORMAL
NITRITE UR QL STRIP.AUTO: NEGATIVE
PH UR STRIP.AUTO: 6.5 [PH] (ref 5–8)
PHOSPHATE SERPL-MCNC: 4.1 MG/DL (ref 2.5–4.5)
PLATELET # BLD AUTO: 295 K/UL (ref 164–446)
PMV BLD AUTO: 9.4 FL (ref 9–12.9)
POTASSIUM SERPL-SCNC: 4.8 MMOL/L (ref 3.6–5.5)
PROT UR QL STRIP: 300 MG/DL
PROT UR-MCNC: 233 MG/DL (ref 0–15)
PROT/CREAT UR: 8057 MG/G (ref 10–107)
PTH-INTACT SERPL-MCNC: 163 PG/ML (ref 14–72)
RBC # BLD AUTO: 3.65 M/UL (ref 4.2–5.4)
RBC # URNS HPF: NORMAL /HPF
RBC UR QL AUTO: ABNORMAL
SODIUM SERPL-SCNC: 138 MMOL/L (ref 135–145)
SP GR UR STRIP.AUTO: 1.01
TIBC SERPL-MCNC: 258 UG/DL (ref 250–450)
UIBC SERPL-MCNC: 178 UG/DL (ref 110–370)
UROBILINOGEN UR STRIP.AUTO-MCNC: 0.2 MG/DL
WBC # BLD AUTO: 6.3 K/UL (ref 4.8–10.8)
WBC #/AREA URNS HPF: NORMAL /HPF

## 2022-12-07 PROCEDURE — 82306 VITAMIN D 25 HYDROXY: CPT

## 2022-12-07 PROCEDURE — 85027 COMPLETE CBC AUTOMATED: CPT

## 2022-12-07 PROCEDURE — 84156 ASSAY OF PROTEIN URINE: CPT

## 2022-12-07 PROCEDURE — 83970 ASSAY OF PARATHORMONE: CPT

## 2022-12-07 PROCEDURE — 84100 ASSAY OF PHOSPHORUS: CPT

## 2022-12-07 PROCEDURE — 36415 COLL VENOUS BLD VENIPUNCTURE: CPT

## 2022-12-07 PROCEDURE — 83550 IRON BINDING TEST: CPT

## 2022-12-07 PROCEDURE — 80048 BASIC METABOLIC PNL TOTAL CA: CPT

## 2022-12-07 PROCEDURE — 82728 ASSAY OF FERRITIN: CPT

## 2022-12-07 PROCEDURE — 82570 ASSAY OF URINE CREATININE: CPT | Mod: 91

## 2022-12-07 PROCEDURE — 81001 URINALYSIS AUTO W/SCOPE: CPT

## 2022-12-07 PROCEDURE — 82043 UR ALBUMIN QUANTITATIVE: CPT

## 2022-12-07 PROCEDURE — 83540 ASSAY OF IRON: CPT

## 2022-12-07 PROCEDURE — 82040 ASSAY OF SERUM ALBUMIN: CPT

## 2022-12-08 LAB
CREAT UR-MCNC: 28.96 MG/DL
MICROALBUMIN UR-MCNC: 159.8 MG/DL
MICROALBUMIN/CREAT UR: 5518 MG/G (ref 0–30)

## 2022-12-14 DIAGNOSIS — E78.5 DYSLIPIDEMIA: ICD-10-CM

## 2022-12-14 RX ORDER — ATORVASTATIN CALCIUM 80 MG/1
80 TABLET, FILM COATED ORAL DAILY
Qty: 90 TABLET | Refills: 3 | Status: SHIPPED | OUTPATIENT
Start: 2022-12-14 | End: 2023-07-27 | Stop reason: SDUPTHER

## 2022-12-14 NOTE — TELEPHONE ENCOUNTER
Received request via: Patient    Was the patient seen in the last year in this department? Yes    Does the patient have an active prescription (recently filled or refills available) for medication(s) requested? No    Does the patient have [facility] and need 100 day supply (blood pressure, diabetes and cholesterol meds only)? Patient does not have SCP    Last ov   11/16/22

## 2022-12-16 ENCOUNTER — TELEPHONE (OUTPATIENT)
Dept: HEALTH INFORMATION MANAGEMENT | Facility: OTHER | Age: 87
End: 2022-12-16
Payer: COMMERCIAL

## 2022-12-16 DIAGNOSIS — E55.9 VITAMIN D DEFICIENCY: ICD-10-CM

## 2022-12-16 DIAGNOSIS — Z78.0 POST-MENOPAUSAL: ICD-10-CM

## 2022-12-16 RX ORDER — ERGOCALCIFEROL 1.25 MG/1
50000 CAPSULE ORAL
Qty: 12 CAPSULE | Refills: 0 | Status: SHIPPED | OUTPATIENT
Start: 2022-12-16 | End: 2023-07-18

## 2022-12-16 NOTE — TELEPHONE ENCOUNTER
1. Caller Name: Ivette Castelan                          Call Back Number: [phone] (home)       How would the patient prefer to be contacted with a response: Kizoomhart message    2. SPECIFIC Action To Be Taken: Orders pending, please sign.    3. Diagnosis/Clinical Reason for Request: DEXA screening     4. Specialty & Provider Name/Lab/Imaging Location: [facility]     5. Is appointment scheduled for requested order/referral: no

## 2023-01-26 ENCOUNTER — TELEPHONE (OUTPATIENT)
Dept: MEDICAL GROUP | Facility: PHYSICIAN GROUP | Age: 88
End: 2023-01-26

## 2023-01-26 ENCOUNTER — NON-PROVIDER VISIT (OUTPATIENT)
Dept: MEDICAL GROUP | Facility: PHYSICIAN GROUP | Age: 88
End: 2023-01-26
Payer: COMMERCIAL

## 2023-01-26 VITALS — DIASTOLIC BLOOD PRESSURE: 77 MMHG | SYSTOLIC BLOOD PRESSURE: 142 MMHG

## 2023-01-26 NOTE — PROGRESS NOTES
Ivette Castelan is a 87 y.o. female here for a non-provider visit for Bp Check    Encounter Vitals  Blood Pressure : (!) 142/77 (pt's blood pressure machine)  Our Blood Pressure machine: 130/60    If abnormal, was the Registered Nurse (office provider if RN is unavailable) notified today?No    Routed to PCP/Requested Provider? Yes

## 2023-01-26 NOTE — TELEPHONE ENCOUNTER
Patient came in for a bp check.    In office her BP was 130/60 left arm.  Her machine read 142/77.    Please advise. No symptoms.

## 2023-02-03 ENCOUNTER — HOSPITAL ENCOUNTER (OUTPATIENT)
Dept: LAB | Facility: MEDICAL CENTER | Age: 88
End: 2023-02-03
Attending: NURSE PRACTITIONER
Payer: COMMERCIAL

## 2023-02-03 DIAGNOSIS — R80.9 TYPE 2 DIABETES MELLITUS WITH MICROALBUMINURIA, WITHOUT LONG-TERM CURRENT USE OF INSULIN (HCC): ICD-10-CM

## 2023-02-03 DIAGNOSIS — E11.29 TYPE 2 DIABETES MELLITUS WITH MICROALBUMINURIA, WITHOUT LONG-TERM CURRENT USE OF INSULIN (HCC): ICD-10-CM

## 2023-02-03 LAB
ALBUMIN SERPL BCP-MCNC: 4.3 G/DL (ref 3.2–4.9)
ALBUMIN/GLOB SERPL: 1.3 G/DL
ALP SERPL-CCNC: 95 U/L (ref 30–99)
ALT SERPL-CCNC: 20 U/L (ref 2–50)
ANION GAP SERPL CALC-SCNC: 11 MMOL/L (ref 7–16)
AST SERPL-CCNC: 23 U/L (ref 12–45)
BILIRUB SERPL-MCNC: 0.3 MG/DL (ref 0.1–1.5)
BUN SERPL-MCNC: 53 MG/DL (ref 8–22)
CALCIUM ALBUM COR SERPL-MCNC: 9.3 MG/DL (ref 8.5–10.5)
CALCIUM SERPL-MCNC: 9.5 MG/DL (ref 8.5–10.5)
CHLORIDE SERPL-SCNC: 108 MMOL/L (ref 96–112)
CO2 SERPL-SCNC: 23 MMOL/L (ref 20–33)
CREAT SERPL-MCNC: 3.14 MG/DL (ref 0.5–1.4)
EST. AVERAGE GLUCOSE BLD GHB EST-MCNC: 177 MG/DL
FASTING STATUS PATIENT QL REPORTED: NORMAL
GFR SERPLBLD CREATININE-BSD FMLA CKD-EPI: 14 ML/MIN/1.73 M 2
GLOBULIN SER CALC-MCNC: 3.2 G/DL (ref 1.9–3.5)
GLUCOSE SERPL-MCNC: 145 MG/DL (ref 65–99)
HBA1C MFR BLD: 7.8 % (ref 4–5.6)
POTASSIUM SERPL-SCNC: 5 MMOL/L (ref 3.6–5.5)
PROT SERPL-MCNC: 7.5 G/DL (ref 6–8.2)
SODIUM SERPL-SCNC: 142 MMOL/L (ref 135–145)

## 2023-02-03 PROCEDURE — 36415 COLL VENOUS BLD VENIPUNCTURE: CPT

## 2023-02-03 PROCEDURE — 80053 COMPREHEN METABOLIC PANEL: CPT

## 2023-02-03 PROCEDURE — 83036 HEMOGLOBIN GLYCOSYLATED A1C: CPT

## 2023-02-16 ENCOUNTER — OFFICE VISIT (OUTPATIENT)
Dept: MEDICAL GROUP | Facility: PHYSICIAN GROUP | Age: 88
End: 2023-02-16
Payer: COMMERCIAL

## 2023-02-16 VITALS
TEMPERATURE: 97.1 F | SYSTOLIC BLOOD PRESSURE: 136 MMHG | RESPIRATION RATE: 18 BRPM | WEIGHT: 126.6 LBS | HEIGHT: 60 IN | BODY MASS INDEX: 24.85 KG/M2 | HEART RATE: 69 BPM | OXYGEN SATURATION: 98 % | DIASTOLIC BLOOD PRESSURE: 60 MMHG

## 2023-02-16 DIAGNOSIS — J34.89 RHINORRHEA: ICD-10-CM

## 2023-02-16 DIAGNOSIS — E11.21 TYPE 2 DIABETES MELLITUS WITH DIABETIC NEPHROPATHY, WITHOUT LONG-TERM CURRENT USE OF INSULIN (HCC): ICD-10-CM

## 2023-02-16 DIAGNOSIS — N18.4 CKD (CHRONIC KIDNEY DISEASE) STAGE 4, GFR 15-29 ML/MIN (HCC): ICD-10-CM

## 2023-02-16 PROCEDURE — 99214 OFFICE O/P EST MOD 30 MIN: CPT | Performed by: NURSE PRACTITIONER

## 2023-02-16 RX ORDER — AZELASTINE 1 MG/ML
1 SPRAY, METERED NASAL 2 TIMES DAILY PRN
Qty: 30 ML | Refills: 1 | Status: SHIPPED | OUTPATIENT
Start: 2023-02-16 | End: 2023-07-18

## 2023-02-16 ASSESSMENT — PATIENT HEALTH QUESTIONNAIRE - PHQ9: CLINICAL INTERPRETATION OF PHQ2 SCORE: 0

## 2023-02-16 ASSESSMENT — FIBROSIS 4 INDEX: FIB4 SCORE: 1.48

## 2023-02-16 NOTE — PROGRESS NOTES
CC: Lab review, runny nose, episode of racing heart at night    HISTORY OF THE PRESENT ILLNESS: Patient is a 87 y.o. female. This pleasant patient is here today for evaluation and management of the following health problems.      Type 2 diabetes mellitus with diabetic nephropathy, without long-term current use of insulin ([facility])  This is a chronic health problem that is well controlled with current medications and lifestyle measures.  A1c is 7.8%.  Now taking Farxiga 5 mg daily.  Fasting blood sugars have been in the 120s.  On statin and ARB.  Denies polydipsia, polyuria, vision changes.  Due for retinal scan.  Previous point-of-care retinal scan unsuccessful.    Rhinorrhea  Patient reports rhinorrhea since heating and replaced in her home.  Will trickle down her throat at night and make her cough.  Reports episode of waking startled when night and her heart was racing, dreamed she was falling.  Only happened 1 time, no associated chest pain or shortness of breath.    CKD (chronic kidney disease) stage 4, GFR 15-29 ml/min ([facility])  Chronic problem.  Now managed by nephrology.  Taking Farxiga 5 mg daily and losartan 100 mg daily.  Recent labs show increase in serum creatinine and mild decrease in GFR.  Patient is concerned and would like me to message nephrology.  Not taking NSAIDs.    Allergies: Patient has no known allergies.    Current Outpatient Medications Ordered in Epic   Medication Sig Dispense Refill    azelastine (ASTELIN) 137 MCG/SPRAY nasal spray Administer 1 Spray into affected nostril(S) 2 times a day as needed for Rhinitis. 30 mL 1    vitamin D2, Ergocalciferol, (DRISDOL) 1.25 MG (76045 UT) Cap capsule Take 1 Capsule by mouth every 7 days. 12 Capsule 0    atorvastatin (LIPITOR) 80 MG tablet Take 1 Tablet by mouth every day. 90 Tablet 3    losartan (COZAAR) 100 MG Tab Take 1 Tablet by mouth every day. 90 Tablet 3    dapagliflozin propanediol (FARXIGA) 5 MG Tab Take 1 Tablet by mouth every day. 90 Tablet 3     metoprolol SR (TOPROL XL) 50 MG TABLET SR 24 HR Take 1 Tablet by mouth every day. 90 Tablet 3    amLODIPine (NORVASC) 10 MG Tab Take 1 Tablet by mouth every day.      BIOTIN 5000 PO Take  by mouth.      Cholecalciferol (D3-1000) 1000 UNIT Cap Take  by mouth.      Cyanocobalamin (B-12) 1000 MCG Cap Take  by mouth.      Omega-3 Fatty Acids (OMEGA 3 PO) Take  by mouth.      Multiple Vitamins-Minerals (WOMENS MULTIVITAMIN PO) Take  by mouth.       No current Epic-ordered facility-administered medications on file.       Past Medical History:   Diagnosis Date    Arthritis     Blood transfusion without reported diagnosis     Cataract     Diabetes (HCC)     Kidney disease        Past Surgical History:   Procedure Laterality Date    BLADDER SLING FEMALE      CATARACT EXTRACTION WITH IOL         Social History     Tobacco Use    Smoking status: Former     Years: 20.00     Types: Cigarettes     Quit date:      Years since quittin.1    Smokeless tobacco: Never   Vaping Use    Vaping Use: Never used   Substance Use Topics    Alcohol use: Not Currently    Drug use: Never       Family History   Problem Relation Age of Onset    Heart Disease Mother     Stroke Mother     Colon Cancer Father     Hypertension Sister     Heart Disease Brother     Kidney Disease Neg Hx        ROS:As in HPI, otherwise negative for chest pain, dyspnea, abdominal pain, dysuria, blood in stool, fever          Exam: /60 (BP Location: Left arm)   Pulse 69   Temp 36.2 °C (97.1 °F) (Temporal)   Resp 18   Ht 1.524 m (5')   Wt 57.4 kg (126 lb 9.6 oz)   SpO2 98%  Body mass index is 24.72 kg/m².    General: Alert, delightful, well nourished, well developed female in NAD  HEENT: Normocephalic. Eyes conjunctiva clear lids without ptosis, pupils equal and reactive to light, ears normal shape and contour, canals are clear bilaterally, nasal mucosa without erythema and drainage, oropharynx is without erythema, edema or exudates.   Neck: Supple  without bruit. Thyroid is not enlarged.  Pulmonary: Clear to ausculation.  Normal effort. No rales, ronchi, or wheezing.  Cardiovascular: Normal rate and rhythm without murmur.  No lower extremity edema.  Neurologic: Grossly nonfocal  Lymph: No cervical or supraclavicular lymph nodes are palpable  Skin: Warm and dry.    Psych: Normal mood and affect. Alert and oriented. Judgment and insight is normal.    Component      Latest Ref Rng 2/3/2023   Sodium      135 - 145 mmol/L 142    Potassium      3.6 - 5.5 mmol/L 5.0    Chloride      96 - 112 mmol/L 108    Co2      20 - 33 mmol/L 23    Anion Gap      7.0 - 16.0  11.0    Glucose      65 - 99 mg/dL 145 (H)    Bun      8 - 22 mg/dL 53 (H)    Creatinine      0.50 - 1.40 mg/dL 3.14 (H)    Calcium      8.5 - 10.5 mg/dL 9.5    AST(SGOT)      12 - 45 U/L 23    ALT(SGPT)      2 - 50 U/L 20    Alkaline Phosphatase      30 - 99 U/L 95    Total Bilirubin      0.1 - 1.5 mg/dL 0.3    Albumin      3.2 - 4.9 g/dL 4.3    Total Protein      6.0 - 8.2 g/dL 7.5    Globulin      1.9 - 3.5 g/dL 3.2    A-G Ratio      g/dL 1.3    Glycohemoglobin      4.0 - 5.6 % 7.8 (H)    Estim. Avg Glu      mg/dL 177    GFR (CKD-EPI)      >60 mL/min/1.73 m 2 14 !    Fasting Status Fasting    Correct Calcium      8.5 - 10.5 mg/dL 9.3           Please note that this dictation was created using voice recognition software. I have made every reasonable attempt to correct obvious errors, but I expect that there are errors of grammar and possibly content that I did not discover before finalizing the note.      Assessment/Plan  1. Type 2 diabetes mellitus with diabetic nephropathy, without long-term current use of insulin (HCC)  Well-controlled.  Continue with Farxiga.  Continue with lifestyle measures.  Will refer to optometry for retinal scan.  - Referral to Optometry    2. Rhinorrhea  We will trial Astelin nasal spray.  Advised to elevate head at night.  Reviewed with patient that her dream may have been stress  related or may be had mild apneic episode.  She will continue to monitor.  - azelastine (ASTELIN) 137 MCG/SPRAY nasal spray; Administer 1 Spray into affected nostril(S) 2 times a day as needed for Rhinitis.  Dispense: 30 mL; Refill: 1    3. CKD (chronic kidney disease) stage 4, GFR 15-29 ml/min ([facility])  Has upcoming appointment with nephrology next month.  We will reach out to nephrology to see if they would like to do repeat labs prior to her appointment with him.    Patient will return to clinic in 3 months or sooner if needed.  We will get point-of-care A1c at next appointment.      My total time spent caring for the patient on the day of the encounter was 31 minutes.   This does not include time spent on separately billable procedures/tests.

## 2023-02-17 NOTE — ASSESSMENT & PLAN NOTE
Chronic problem.  Now managed by nephrology.  Taking Farxiga 5 mg daily and losartan 100 mg daily.  Recent labs show increase in serum creatinine and mild decrease in GFR.  Patient is concerned and would like me to message nephrology.  Not taking NSAIDs.

## 2023-02-17 NOTE — ASSESSMENT & PLAN NOTE
This is a chronic health problem that is well controlled with current medications and lifestyle measures.  A1c is 7.8%.  Now taking Farxiga 5 mg daily.  Fasting blood sugars have been in the 120s.  On statin and ARB.  Denies polydipsia, polyuria, vision changes.  Due for retinal scan.  Previous point-of-care retinal scan unsuccessful.

## 2023-02-17 NOTE — ASSESSMENT & PLAN NOTE
Patient reports rhinorrhea since heating and replaced in her home.  Will trickle down her throat at night and make her cough.  Reports episode of waking startled when night and her heart was racing, dreamed she was falling.  Only happened 1 time, no associated chest pain or shortness of breath.

## 2023-02-20 ENCOUNTER — TELEPHONE (OUTPATIENT)
Dept: MEDICAL GROUP | Facility: PHYSICIAN GROUP | Age: 88
End: 2023-02-20
Payer: COMMERCIAL

## 2023-02-20 DIAGNOSIS — N18.4 CKD (CHRONIC KIDNEY DISEASE) STAGE 4, GFR 15-29 ML/MIN (HCC): ICD-10-CM

## 2023-02-28 RX ORDER — LOSARTAN POTASSIUM 100 MG/1
100 TABLET ORAL DAILY
Qty: 90 TABLET | Refills: 3 | Status: SHIPPED | OUTPATIENT
Start: 2023-02-28 | End: 2023-05-19 | Stop reason: SDUPTHER

## 2023-03-09 ENCOUNTER — HOSPITAL ENCOUNTER (OUTPATIENT)
Dept: LAB | Facility: MEDICAL CENTER | Age: 88
End: 2023-03-09
Attending: NURSE PRACTITIONER
Payer: COMMERCIAL

## 2023-03-09 DIAGNOSIS — N18.4 CKD (CHRONIC KIDNEY DISEASE) STAGE 4, GFR 15-29 ML/MIN (HCC): ICD-10-CM

## 2023-03-09 LAB
ALBUMIN SERPL BCP-MCNC: 4.2 G/DL (ref 3.2–4.9)
APPEARANCE UR: CLEAR
BACTERIA #/AREA URNS HPF: NEGATIVE /HPF
BILIRUB UR QL STRIP.AUTO: NEGATIVE
BUN SERPL-MCNC: 46 MG/DL (ref 8–22)
CALCIUM ALBUM COR SERPL-MCNC: 9.1 MG/DL (ref 8.5–10.5)
CALCIUM SERPL-MCNC: 9.3 MG/DL (ref 8.5–10.5)
CHLORIDE SERPL-SCNC: 104 MMOL/L (ref 96–112)
CO2 SERPL-SCNC: 24 MMOL/L (ref 20–33)
COLOR UR: YELLOW
CREAT SERPL-MCNC: 2.94 MG/DL (ref 0.5–1.4)
CREAT UR-MCNC: 87.87 MG/DL
EPI CELLS #/AREA URNS HPF: ABNORMAL /HPF
FASTING STATUS PATIENT QL REPORTED: NORMAL
GFR SERPLBLD CREATININE-BSD FMLA CKD-EPI: 15 ML/MIN/1.73 M 2
GLUCOSE SERPL-MCNC: 137 MG/DL (ref 65–99)
GLUCOSE UR STRIP.AUTO-MCNC: 500 MG/DL
HYALINE CASTS #/AREA URNS LPF: ABNORMAL /LPF
KETONES UR STRIP.AUTO-MCNC: NEGATIVE MG/DL
LEUKOCYTE ESTERASE UR QL STRIP.AUTO: ABNORMAL
MICRO URNS: ABNORMAL
NITRITE UR QL STRIP.AUTO: NEGATIVE
PH UR STRIP.AUTO: 7 [PH] (ref 5–8)
PHOSPHATE SERPL-MCNC: 4.1 MG/DL (ref 2.5–4.5)
POTASSIUM SERPL-SCNC: 4.7 MMOL/L (ref 3.6–5.5)
PROT UR QL STRIP: 300 MG/DL
PROT UR-MCNC: 320 MG/DL (ref 0–15)
PROT/CREAT UR: 3642 MG/G (ref 10–107)
RBC # URNS HPF: ABNORMAL /HPF
RBC UR QL AUTO: NEGATIVE
RENAL EPI CELLS #/AREA URNS HPF: ABNORMAL /HPF
SODIUM SERPL-SCNC: 138 MMOL/L (ref 135–145)
SP GR UR STRIP.AUTO: 1.02
TRANS CELLS #/AREA URNS HPF: ABNORMAL /HPF
UROBILINOGEN UR STRIP.AUTO-MCNC: 0.2 MG/DL
WBC #/AREA URNS HPF: ABNORMAL /HPF

## 2023-03-09 PROCEDURE — 84156 ASSAY OF PROTEIN URINE: CPT

## 2023-03-09 PROCEDURE — 81001 URINALYSIS AUTO W/SCOPE: CPT

## 2023-03-09 PROCEDURE — 80069 RENAL FUNCTION PANEL: CPT

## 2023-03-09 PROCEDURE — 36415 COLL VENOUS BLD VENIPUNCTURE: CPT

## 2023-03-09 PROCEDURE — 82570 ASSAY OF URINE CREATININE: CPT

## 2023-03-09 PROCEDURE — 82043 UR ALBUMIN QUANTITATIVE: CPT

## 2023-03-10 LAB
CREAT UR-MCNC: 88.62 MG/DL
MICROALBUMIN UR-MCNC: >40 MG/DL
MICROALBUMIN/CREAT UR: 451 MG/G (ref 0–30)

## 2023-03-15 DIAGNOSIS — N18.4 CKD (CHRONIC KIDNEY DISEASE) STAGE 4, GFR 15-29 ML/MIN (HCC): ICD-10-CM

## 2023-03-15 DIAGNOSIS — E11.21 TYPE 2 DIABETES MELLITUS WITH DIABETIC NEPHROPATHY, WITHOUT LONG-TERM CURRENT USE OF INSULIN (HCC): ICD-10-CM

## 2023-03-16 RX ORDER — AMLODIPINE BESYLATE 10 MG/1
10 TABLET ORAL DAILY
Qty: 90 TABLET | Refills: 3 | Status: SHIPPED | OUTPATIENT
Start: 2023-03-16 | End: 2023-07-27 | Stop reason: SDUPTHER

## 2023-03-16 RX ORDER — DAPAGLIFLOZIN 5 MG/1
5 TABLET, FILM COATED ORAL DAILY
Qty: 90 TABLET | Refills: 3 | Status: SHIPPED | OUTPATIENT
Start: 2023-03-16 | End: 2023-05-22 | Stop reason: SDUPTHER

## 2023-03-16 NOTE — TELEPHONE ENCOUNTER
Received request via: Patient    Was the patient seen in the last year in this department? Yes    Does the patient have an active prescription (recently filled or refills available) for medication(s) requested? No    Does the patient have [facility] and need 100 day supply (blood pressure, diabetes and cholesterol meds only)? Patient does not have SCP    Last ov 2/16/23

## 2023-03-17 ENCOUNTER — TELEMEDICINE2 (OUTPATIENT)
Dept: NEPHROLOGY | Facility: MEDICAL CENTER | Age: 88
End: 2023-03-17
Payer: COMMERCIAL

## 2023-03-17 VITALS
TEMPERATURE: 97.3 F | DIASTOLIC BLOOD PRESSURE: 90 MMHG | WEIGHT: 123 LBS | OXYGEN SATURATION: 97 % | HEART RATE: 86 BPM | HEIGHT: 60 IN | SYSTOLIC BLOOD PRESSURE: 128 MMHG | RESPIRATION RATE: 14 BRPM | BODY MASS INDEX: 24.15 KG/M2

## 2023-03-17 DIAGNOSIS — E55.9 VITAMIN D DEFICIENCY: ICD-10-CM

## 2023-03-17 DIAGNOSIS — N18.4 CKD (CHRONIC KIDNEY DISEASE) STAGE 4, GFR 15-29 ML/MIN (HCC): ICD-10-CM

## 2023-03-17 DIAGNOSIS — D64.9 ANEMIA, UNSPECIFIED TYPE: ICD-10-CM

## 2023-03-17 DIAGNOSIS — E11.21 TYPE 2 DIABETES MELLITUS WITH DIABETIC NEPHROPATHY, WITHOUT LONG-TERM CURRENT USE OF INSULIN (HCC): ICD-10-CM

## 2023-03-17 DIAGNOSIS — I10 ESSENTIAL HYPERTENSION: ICD-10-CM

## 2023-03-17 PROCEDURE — 99214 OFFICE O/P EST MOD 30 MIN: CPT | Mod: 95 | Performed by: INTERNAL MEDICINE

## 2023-03-17 ASSESSMENT — ENCOUNTER SYMPTOMS
SHORTNESS OF BREATH: 0
ABDOMINAL PAIN: 0
FEVER: 0

## 2023-03-17 ASSESSMENT — FIBROSIS 4 INDEX: FIB4 SCORE: 1.48

## 2023-03-17 NOTE — PROGRESS NOTES
Telemedicine Note     Please note that I could not evaluate the patient in person at the site. All examination and evaluation of the patient and information gathering from the patient and/or the family were done jointly by the requesting physician, CLINT Burns at the site and me via licensed and securely encrypted tele-video communication equipment with the assistance of a trained tele-presenter at the originating site.  As such, my assessments and recommendations are limited as far as such telecommnication allows.     Consulting MD: Franko Green M.D.    Consulting MD location: [place], [place]  Requesting Physician: CLINT Burns   Patient name:      Ivette Castelan  :                    1935   MRN:                   [id number]     Data source:  Patient   Video Time:         30 minutes.   Originating site: [place], [place]  Originating site MA: [place]     Date of Service: 3/17/2023      Chief Complaint:   Chief Complaint   Patient presents with    Follow-Up      CKD (chronic kidney disease) stage 4, GFR 15-29 ml/min (HCC            History of Present Illness:    Ivette Castelan is a 87 y.o. female with DM2, HTN, CKD4 who presents today for follow-up.    She moved from [place] in  to [place], [place] to be closer to family.   She has lost some weight.  She also complains of fatigue with exertion.     Re: DM2, diagnosed  when she was overweight. Patient has had microalbuminuria since at least 2010, and macroalbuminuria since 2016. Patient has seen an eye doctor and does not have retinopathy. She has had periods of good control of DM and some periods of poor control. She stopped glipizide and is taking farxiga. She is tolerating well. Says her sugars are usually 135-140.     Re: HTN, diagnosed around . BP control over the years has been fairly controlled. She is checking her BP at home now, usually 120's / 60's. She denies LE edema.     Re: CKD, she has been seeing a nephrologist in  [place], [place] since 2022. She had 3 pregnancies without pre-eclampsia or DM. She denies history of RYAN, kidney stone. She denies bladder troubles, but is incontinent. She denies NSAIDs. Her appetite is \"very good.\" Denies headache, N/V. Denies metallic taste.            ROS:    Review of Systems   Constitutional:  Negative for fever.   Respiratory:  Negative for shortness of breath.    Cardiovascular:  Negative for chest pain.   Gastrointestinal:  Negative for abdominal pain.   Genitourinary:  Negative for dysuria.   All other systems reviewed and are negative.                Past Medical History:  Past Medical History:   Diagnosis Date    Arthritis     Blood transfusion without reported diagnosis     Cataract     Diabetes (HCC)     Kidney disease      Past Surgical History:   Procedure Laterality Date    BLADDER SLING FEMALE      CATARACT EXTRACTION WITH IOL          Current Outpatient Medications   Medication Sig Dispense Refill    dapagliflozin propanediol (FARXIGA) 5 MG Tab Take 1 Tablet by mouth every day. 90 Tablet 3    amLODIPine (NORVASC) 10 MG Tab Take 1 Tablet by mouth every day. 90 Tablet 3    losartan (COZAAR) 100 MG Tab Take 1 Tablet by mouth every day. 90 Tablet 3    azelastine (ASTELIN) 137 MCG/SPRAY nasal spray Administer 1 Spray into affected nostril(S) 2 times a day as needed for Rhinitis. 30 mL 1    vitamin D2, Ergocalciferol, (DRISDOL) 1.25 MG (85335 UT) Cap capsule Take 1 Capsule by mouth every 7 days. 12 Capsule 0    atorvastatin (LIPITOR) 80 MG tablet Take 1 Tablet by mouth every day. 90 Tablet 3    metoprolol SR (TOPROL XL) 50 MG TABLET SR 24 HR Take 1 Tablet by mouth every day. 90 Tablet 3    BIOTIN 5000 PO Take  by mouth.      Cholecalciferol (D3-1000) 1000 UNIT Cap Take  by mouth.      Cyanocobalamin (B-12) 1000 MCG Cap Take  by mouth.      Omega-3 Fatty Acids (OMEGA 3 PO) Take  by mouth.      Multiple Vitamins-Minerals (WOMENS MULTIVITAMIN PO) Take  by mouth.       No current " facility-administered medications for this visit.               Allergies:   No Known Allergies     Current Outpatient Medications:   (Not in a hospital admission)            Physical Exam:   Vitals:   Vitals:    03/17/23 1054   BP: (!) 128/90   Pulse: 86   Resp: 14   Temp: 36.3 °C (97.3 °F)   SpO2: 97%   Weight: 55.8 kg (123 lb)   Height: 1.524 m (5')       Physical Exam:  Constitutional: Alert, no distress, well-groomed.  Skin: No rashes in visible areas.  Eye: Round. Conjunctiva clear, lids normal. No icterus.   ENMT: Lips pink without lesions, good dentition, moist mucous membranes. Phonation normal.  Neck: No masses, no thyromegaly. Moves freely without pain.  Respiratory: Unlabored respiratory effort, no cough or audible wheeze. No crankles per site MA.   CV: heart rhythm regular with no murmurs per site MA. No LE edema per site MA.   Psych: Alert and oriented x3, normal affect and mood.           Imaging reviewed & Visualized by me:  DIAGNOSTIC IMAGING REVIEWED AND/OR INTERPRETED BY ME:        Laboratory:   Recent Labs     05/27/22  1521 11/09/22  0857 11/09/22  0857 12/07/22  1045 02/03/23  1023 03/09/23  1133   ALBUMIN 4.1 4.0   < > 4.2 4.3 4.2   HDL  --  60  --   --   --   --    TRIGLYCERIDE  --  137  --   --   --   --    SODIUM 140 139   < > 138 142 138   POTASSIUM 4.8 4.4   < > 4.8 5.0 4.7   CHLORIDE 108 105   < > 104 108 104   CO2 22* 22   < > 24 23 24   BUN 55* 43*   < > 52* 53* 46*   CREATININE  --  2.37*   < > 2.65* 3.14* 2.94*   PHOSPHORUS 4.4  --   --  4.1  --  4.1    < > = values in this interval not displayed.     Lab Results   Component Value Date/Time    WBC 6.3 12/07/2022 10:45 AM    RBC 3.65 (L) 12/07/2022 10:45 AM    HEMOGLOBIN 11.1 (L) 12/07/2022 10:45 AM    HEMATOCRIT 34.4 (L) 12/07/2022 10:45 AM    MCV 94.2 12/07/2022 10:45 AM    MCH 30.4 12/07/2022 10:45 AM    MCHC 32.3 (L) 12/07/2022 10:45 AM    MPV 9.4 12/07/2022 10:45 AM        URINALYSIS:  Lab Results   Component Value Date/Time     COLORURINE Yellow 03/09/2023 1134    CLARITY Clear 03/09/2023 1134    SPECGRAVITY 1.021 03/09/2023 1134    PHURINE 7.0 03/09/2023 1134    KETONES Negative 03/09/2023 1134    PROTEINURIN 300 (A) 03/09/2023 1134    BILIRUBINUR Negative 03/09/2023 1134    UROBILU 0.2 03/09/2023 1134    NITRITE Negative 03/09/2023 1134    LEUKESTERAS Small (A) 03/09/2023 1134    OCCULTBLOOD Negative 03/09/2023 1134       [facility]  Lab Results   Component Value Date/Time    TOTPROTUR 320.0 (H) 03/09/2023 1134      Lab Results   Component Value Date/Time    CREATININEU 87.87 03/09/2023 1134           [unfilled]       Assessment and Plan:   Ivette Castelan is a 86 y.o. female with DM2, HTN, CKD4 who presents today to establish nephrology care.     1. CKD (chronic kidney disease) stage 4, GFR 15-29 ml/min ([facility])  -Creatinine and GFR remain in stage IV, but flirting with stage V.  Underlying CKD likely from diabetic nephropathy, hypertension, age-related kidney decline. Avoid NSAIDs and other nephrotoxins.  Recommend low-sodium diet. I explained the importance of glycemic and blood pressure control to help slow CKD progression.  Maintain losartan for long-term kidney protection, as well as SGLT2 inhibitor.  Patient is at high risk of CKD progression to ESRD given her macroalbuminuria.  Patient thinks she would try hemodialysis.  Given her advanced age, I would recommend time-limited trial of hemodialysis via tunneled dialysis catheter whenever that time comes.  Fortunately, no acute need for dialysis at this time.    2. Type 2 diabetes mellitus with diabetic nephropathy, without long-term current use of insulin (HCC)  -Better controlled per patient.  Continue Farxiga 5 mg daily.  This can be increased to 10 mg daily as needed for further glycemic control.  Defer further diabetes management to primary care.    3. Essential hypertension  -Mildly uncontrolled.  Continue losartan, amlodipine, metoprolol.  If hypertension remains uncontrolled, I would  recommend adding Lasix 80 mg p.o. daily.    4. Anemia, unspecified type  -Mild as of labs December 2022.  Recommend check iron studies with next labs.  No acute need for Epogen with hemoglobin over 10.          Return to clinic 1-2 months with pre-clinic      I spent total of 30 minutes on face-to-face via telemonitor more than half of which was spent coordinating this with patient as well as reviewing the images and labs results with the patient, and answering all questions and explaining in details the assessment and recommendation sections above. The patient expressed their understanding and agreement to the above.        This EM service was conducted utilizing secure and encrypted videoconferencing equipment with the assistance of a trained tele-presenter at the originating site.       Franko Green MD  Nephrology  [facility]

## 2023-05-05 ENCOUNTER — HOSPITAL ENCOUNTER (OUTPATIENT)
Dept: LAB | Facility: MEDICAL CENTER | Age: 88
End: 2023-05-05
Attending: INTERNAL MEDICINE
Payer: COMMERCIAL

## 2023-05-05 DIAGNOSIS — N18.4 CKD (CHRONIC KIDNEY DISEASE) STAGE 4, GFR 15-29 ML/MIN (HCC): ICD-10-CM

## 2023-05-05 DIAGNOSIS — D64.9 ANEMIA, UNSPECIFIED TYPE: ICD-10-CM

## 2023-05-05 DIAGNOSIS — E55.9 VITAMIN D DEFICIENCY: ICD-10-CM

## 2023-05-05 DIAGNOSIS — I10 ESSENTIAL HYPERTENSION: ICD-10-CM

## 2023-05-05 DIAGNOSIS — E11.21 TYPE 2 DIABETES MELLITUS WITH DIABETIC NEPHROPATHY, WITHOUT LONG-TERM CURRENT USE OF INSULIN (HCC): ICD-10-CM

## 2023-05-05 LAB
25(OH)D3 SERPL-MCNC: 41 NG/ML (ref 30–100)
ALBUMIN SERPL BCP-MCNC: 4.2 G/DL (ref 3.2–4.9)
ANION GAP SERPL CALC-SCNC: 13 MMOL/L (ref 7–16)
APPEARANCE UR: CLEAR
BACTERIA #/AREA URNS HPF: NEGATIVE /HPF
BILIRUB UR QL STRIP.AUTO: NEGATIVE
BUN SERPL-MCNC: 60 MG/DL (ref 8–22)
CALCIUM SERPL-MCNC: 9.1 MG/DL (ref 8.5–10.5)
CHLORIDE SERPL-SCNC: 110 MMOL/L (ref 96–112)
CO2 SERPL-SCNC: 20 MMOL/L (ref 20–33)
COLOR UR: YELLOW
CREAT SERPL-MCNC: 3 MG/DL (ref 0.5–1.4)
CREAT UR-MCNC: 55.02 MG/DL
CREAT UR-MCNC: 56.13 MG/DL
EPI CELLS #/AREA URNS HPF: NEGATIVE /HPF
ERYTHROCYTE [DISTWIDTH] IN BLOOD BY AUTOMATED COUNT: 43.3 FL (ref 35.9–50)
FERRITIN SERPL-MCNC: 209 NG/ML (ref 10–291)
GFR SERPLBLD CREATININE-BSD FMLA CKD-EPI: 15 ML/MIN/1.73 M 2
GLUCOSE SERPL-MCNC: 193 MG/DL (ref 65–99)
GLUCOSE UR STRIP.AUTO-MCNC: >=1000 MG/DL
HCT VFR BLD AUTO: 32.9 % (ref 37–47)
HGB BLD-MCNC: 10.6 G/DL (ref 12–16)
HYALINE CASTS #/AREA URNS LPF: NORMAL /LPF
IRON SATN MFR SERPL: 27 % (ref 15–55)
IRON SERPL-MCNC: 68 UG/DL (ref 40–170)
KETONES UR STRIP.AUTO-MCNC: NEGATIVE MG/DL
LEUKOCYTE ESTERASE UR QL STRIP.AUTO: NEGATIVE
MCH RBC QN AUTO: 30.6 PG (ref 27–33)
MCHC RBC AUTO-ENTMCNC: 32.2 G/DL (ref 33.6–35)
MCV RBC AUTO: 95.1 FL (ref 81.4–97.8)
MICRO URNS: ABNORMAL
MICROALBUMIN UR-MCNC: 108.5 MG/DL
MICROALBUMIN/CREAT UR: 1933 MG/G (ref 0–30)
NITRITE UR QL STRIP.AUTO: NEGATIVE
PH UR STRIP.AUTO: 6 [PH] (ref 5–8)
PHOSPHATE SERPL-MCNC: 3.9 MG/DL (ref 2.5–4.5)
PLATELET # BLD AUTO: 263 K/UL (ref 164–446)
PMV BLD AUTO: 9 FL (ref 9–12.9)
POTASSIUM SERPL-SCNC: 4.9 MMOL/L (ref 3.6–5.5)
PROT UR QL STRIP: 300 MG/DL
PROT UR-MCNC: 164 MG/DL (ref 0–15)
PROT/CREAT UR: 2981 MG/G (ref 10–107)
PTH-INTACT SERPL-MCNC: 82.1 PG/ML (ref 14–72)
RBC # BLD AUTO: 3.46 M/UL (ref 4.2–5.4)
RBC # URNS HPF: NORMAL /HPF
RBC UR QL AUTO: NEGATIVE
SODIUM SERPL-SCNC: 143 MMOL/L (ref 135–145)
SP GR UR STRIP.AUTO: 1.02
TIBC SERPL-MCNC: 255 UG/DL (ref 250–450)
UIBC SERPL-MCNC: 187 UG/DL (ref 110–370)
UROBILINOGEN UR STRIP.AUTO-MCNC: 0.2 MG/DL
WBC # BLD AUTO: 6.5 K/UL (ref 4.8–10.8)
WBC #/AREA URNS HPF: NORMAL /HPF

## 2023-05-05 PROCEDURE — 82570 ASSAY OF URINE CREATININE: CPT

## 2023-05-05 PROCEDURE — 82306 VITAMIN D 25 HYDROXY: CPT

## 2023-05-05 PROCEDURE — 83550 IRON BINDING TEST: CPT

## 2023-05-05 PROCEDURE — 36415 COLL VENOUS BLD VENIPUNCTURE: CPT

## 2023-05-05 PROCEDURE — 80048 BASIC METABOLIC PNL TOTAL CA: CPT

## 2023-05-05 PROCEDURE — 84156 ASSAY OF PROTEIN URINE: CPT

## 2023-05-05 PROCEDURE — 82043 UR ALBUMIN QUANTITATIVE: CPT

## 2023-05-05 PROCEDURE — 83970 ASSAY OF PARATHORMONE: CPT

## 2023-05-05 PROCEDURE — 81001 URINALYSIS AUTO W/SCOPE: CPT

## 2023-05-05 PROCEDURE — 84100 ASSAY OF PHOSPHORUS: CPT

## 2023-05-05 PROCEDURE — 82040 ASSAY OF SERUM ALBUMIN: CPT

## 2023-05-05 PROCEDURE — 83540 ASSAY OF IRON: CPT

## 2023-05-05 PROCEDURE — 85027 COMPLETE CBC AUTOMATED: CPT

## 2023-05-05 PROCEDURE — 82728 ASSAY OF FERRITIN: CPT

## 2023-05-12 ENCOUNTER — TELEMEDICINE2 (OUTPATIENT)
Dept: NEPHROLOGY | Facility: MEDICAL CENTER | Age: 88
End: 2023-05-12
Payer: COMMERCIAL

## 2023-05-12 VITALS
SYSTOLIC BLOOD PRESSURE: 120 MMHG | HEART RATE: 70 BPM | HEIGHT: 60 IN | BODY MASS INDEX: 24.35 KG/M2 | TEMPERATURE: 97.3 F | WEIGHT: 124 LBS | OXYGEN SATURATION: 98 % | DIASTOLIC BLOOD PRESSURE: 70 MMHG | RESPIRATION RATE: 12 BRPM

## 2023-05-12 DIAGNOSIS — N25.81 HYPERPARATHYROIDISM DUE TO RENAL INSUFFICIENCY (HCC): ICD-10-CM

## 2023-05-12 DIAGNOSIS — I10 ESSENTIAL HYPERTENSION: Chronic | ICD-10-CM

## 2023-05-12 DIAGNOSIS — D63.8 ANEMIA OF CHRONIC DISEASE: ICD-10-CM

## 2023-05-12 DIAGNOSIS — N18.4 CKD (CHRONIC KIDNEY DISEASE) STAGE 4, GFR 15-29 ML/MIN (HCC): ICD-10-CM

## 2023-05-12 DIAGNOSIS — E11.21 TYPE 2 DIABETES MELLITUS WITH DIABETIC NEPHROPATHY, WITHOUT LONG-TERM CURRENT USE OF INSULIN (HCC): Chronic | ICD-10-CM

## 2023-05-12 PROCEDURE — 3078F DIAST BP <80 MM HG: CPT | Mod: 95 | Performed by: INTERNAL MEDICINE

## 2023-05-12 PROCEDURE — 99214 OFFICE O/P EST MOD 30 MIN: CPT | Mod: 95 | Performed by: INTERNAL MEDICINE

## 2023-05-12 PROCEDURE — 3074F SYST BP LT 130 MM HG: CPT | Mod: 95 | Performed by: INTERNAL MEDICINE

## 2023-05-12 ASSESSMENT — ENCOUNTER SYMPTOMS
ABDOMINAL PAIN: 0
SHORTNESS OF BREATH: 0
FEVER: 0

## 2023-05-12 ASSESSMENT — FIBROSIS 4 INDEX: FIB4 SCORE: 1.66

## 2023-05-12 NOTE — PATIENT INSTRUCTIONS
"Watch out for symptoms of kidney toxins (we call them \"uremic toxins\") building up. The symptoms of uremic toxin buildup include loss of appetite, metallic taste in the mouth, and persistent headache, nausea, and vomiting lasting more than a week or two. If you develop these symptoms, this might mean you need dialysis very soon and should call our office or possibly go to the emergency room if you can't reach us.    "

## 2023-05-12 NOTE — PROGRESS NOTES
Telemedicine Note     Please note that I could not evaluate the patient in person at the site. All examination and evaluation of the patient and information gathering from the patient and/or the family were done jointly by the requesting physician, CLINT Burns at the site and me via licensed and securely encrypted tele-video communication equipment with the assistance of a trained tele-presenter at the originating site.  As such, my assessments and recommendations are limited as far as such telecommnication allows.     Consulting MD: Franko Green M.D.    Consulting MD location: [place], [place]  Requesting Physician: CLINT Burns   Patient name:      Ivette Castelan  :                    1935   MRN:                   [id number]     Data source:  Patient, granddaughter Carla   Video Time:         30 minutes.   Originating site: [place], [place]  Originating site MA: [place]     Date of Service: 2023      Chief Complaint:   Chief Complaint   Patient presents with    Follow-Up    Chronic Kidney Disease        History of Present Illness:    Ivette Castelan is a 87 y.o. female with DM2, HTN, CKD4 who presents today for follow-up.    She moved from [place] in  to [place], [place] to be closer to family.       Re: DM2, diagnosed  when she was overweight. Patient has had microalbuminuria since at least , and macroalbuminuria since 2016. Patient has seen an eye doctor and does not have retinopathy. She has had periods of good control of DM and some periods of poor control. She stopped glipizide and is taking farxiga. She is tolerating farxiga well. Says her sugars are averaging 127 over last 30 days.     Re: HTN, diagnosed around . BP control over the years has been fairly controlled. She checks BP at home, averages 119/76. Denies LE edema.     Re: CKD, she has been seeing a nephrologist in [place], [place] since . She had 3 pregnancies without pre-eclampsia or DM. She denies history  "of RYAN, kidney stone. She denies bladder troubles, but is incontinent. She denies NSAIDs. Her appetite is \"very good.\" Weight is stable 122-125 lbs. Gets headaches on right side once in a while. Denies N/V, metallic taste.            ROS:    Review of Systems   Constitutional:  Negative for fever.   Respiratory:  Negative for shortness of breath.    Cardiovascular:  Negative for chest pain.   Gastrointestinal:  Negative for abdominal pain.   Genitourinary:  Negative for dysuria.   All other systems reviewed and are negative.                  Past Medical History:  Past Medical History:   Diagnosis Date    Arthritis     Blood transfusion without reported diagnosis     Cataract     Diabetes (HCC)     Kidney disease      Past Surgical History:   Procedure Laterality Date    BLADDER SLING FEMALE      CATARACT EXTRACTION WITH IOL          Current Outpatient Medications   Medication Sig Dispense Refill    dapagliflozin propanediol (FARXIGA) 5 MG Tab Take 1 Tablet by mouth every day. 90 Tablet 3    amLODIPine (NORVASC) 10 MG Tab Take 1 Tablet by mouth every day. 90 Tablet 3    losartan (COZAAR) 100 MG Tab Take 1 Tablet by mouth every day. 90 Tablet 3    azelastine (ASTELIN) 137 MCG/SPRAY nasal spray Administer 1 Spray into affected nostril(S) 2 times a day as needed for Rhinitis. 30 mL 1    vitamin D2, Ergocalciferol, (DRISDOL) 1.25 MG (56531 UT) Cap capsule Take 1 Capsule by mouth every 7 days. 12 Capsule 0    atorvastatin (LIPITOR) 80 MG tablet Take 1 Tablet by mouth every day. 90 Tablet 3    metoprolol SR (TOPROL XL) 50 MG TABLET SR 24 HR Take 1 Tablet by mouth every day. 90 Tablet 3    BIOTIN 5000 PO Take  by mouth.      Cholecalciferol (D3-1000) 1000 UNIT Cap Take  by mouth.      Cyanocobalamin (B-12) 1000 MCG Cap Take  by mouth.      Omega-3 Fatty Acids (OMEGA 3 PO) Take  by mouth.      Multiple Vitamins-Minerals (WOMENS MULTIVITAMIN PO) Take  by mouth.       No current facility-administered medications for this " visit.               Allergies:   No Known Allergies     Current Outpatient Medications:   (Not in a hospital admission)            Physical Exam:   Vitals:   Vitals:    05/12/23 1115   BP: 120/70   BP Location: Left arm   Patient Position: Sitting   BP Cuff Size: Adult   Pulse: 70   Resp: 12   Temp: 36.3 °C (97.3 °F)   TempSrc: Temporal   SpO2: 98%   Weight: 56.2 kg (124 lb)   Height: 1.524 m (5')       Constitutional: Alert, no distress, well-groomed.  Skin: No rashes in visible areas.  Eye: Round. Conjunctiva clear, lids normal. No icterus.   ENMT: Lips pink without lesions, good dentition, moist mucous membranes. Phonation normal.  Neck: No masses, no thyromegaly. Moves freely without pain.  Respiratory: Unlabored respiratory effort, no cough or audible wheeze. Normal lung sounds per site MA.   CV: skin appears pink and well perfused. No murmur per site MA. No LE edema per site MA.   Psych: Alert and oriented x3, normal affect and mood.          Imaging reviewed & Visualized by me:  DIAGNOSTIC IMAGING REVIEWED AND/OR INTERPRETED BY ME:        Laboratory:   Recent Labs     11/09/22  0857 12/07/22  1045 02/03/23  1023 03/09/23  1133 05/05/23  1307   ALBUMIN 4.0 4.2 4.3 4.2 4.2   HDL 60  --   --   --   --    TRIGLYCERIDE 137  --   --   --   --    SODIUM 139 138 142 138 143   POTASSIUM 4.4 4.8 5.0 4.7 4.9   CHLORIDE 105 104 108 104 110   CO2 22 24 23 24 20   BUN 43* 52* 53* 46* 60*   CREATININE 2.37* 2.65* 3.14* 2.94* 3.00*   PHOSPHORUS  --  4.1  --  4.1 3.9     Lab Results   Component Value Date/Time    WBC 6.5 05/05/2023 01:07 PM    RBC 3.46 (L) 05/05/2023 01:07 PM    HEMOGLOBIN 10.6 (L) 05/05/2023 01:07 PM    HEMATOCRIT 32.9 (L) 05/05/2023 01:07 PM    MCV 95.1 05/05/2023 01:07 PM    MCH 30.6 05/05/2023 01:07 PM    MCHC 32.2 (L) 05/05/2023 01:07 PM    MPV 9.0 05/05/2023 01:07 PM        URINALYSIS:  Lab Results   Component Value Date/Time    COLORURINE Yellow 05/05/2023 1308    CLARITY Clear 05/05/2023 1308     SPECGRAVITY 1.018 05/05/2023 1308    PHURINE 6.0 05/05/2023 1308    KETONES Negative 05/05/2023 1308    PROTEINURIN 300 (A) 05/05/2023 1308    BILIRUBINUR Negative 05/05/2023 1308    UROBILU 0.2 05/05/2023 1308    NITRITE Negative 05/05/2023 1308    LEUKESTERAS Negative 05/05/2023 1308    OCCULTBLOOD Negative 05/05/2023 1308       UPC  Lab Results   Component Value Date/Time    TOTPROTUR 164.0 (H) 05/05/2023 1308      Lab Results   Component Value Date/Time    CREATININEU 55.02 05/05/2023 1308           [unfilled]       Assessment and Plan:   Ivette Castelan is a 87 y.o. female with DM2, HTN, CKD4 who presents today for follow-up.    1. CKD (chronic kidney disease) stage 4, GFR 15-29 ml/min ([facility])  -Creatinine and GFR remain in stage IV, but are flirting with stage V.    Underlying CKD likely from diabetic nephropathy, hypertension, age-related kidney decline.  Recommend avoid NSAIDs and other nephrotoxins.  Recommend low-sodium diet. I explained the importance of glycemic and blood pressure control to help slow CKD progression.  Maintain losartan and Jardiance for long-term kidney protection.  She is at high risk of CKD progression to ESRD with her macroalbuminuria.  She thinks she would try hemodialysis, so if the time comes that she needs dialysis, I would recommend 2 to 4-week time-limited trial of hemodialysis via tunneled dialysis catheter.  Fortunately, there remains no acute need for dialysis at this time.    2. Type 2 diabetes mellitus with diabetic nephropathy, without long-term current use of insulin ([facility])  -Better controlled per patient.  Continue Farxiga and losartan.  Defer further diabetes management to primary care.    3. Essential hypertension  -Controlled.  Continue losartan, amlodipine, metoprolol.  If hypertension starts becoming uncontrolled, I recommend adding Lasix 80 mg daily.    4. Anemia, unspecified type  -Anemia slightly worsening.  Patient's iron studies are equivocal.  If hemoglobin drops to  less than 10, I would recommend starting ferrous sulfate 325 mg p.o. daily.  If no improvement with ferrous sulfate, then I would recommend referral to outpatient infusion center for Retacrit injections.        Return to clinic 6-8 weeks with pre-clinic      I spent total of 30 minutes on face-to-face via telemonitor more than half of which was spent coordinating this with patient as well as reviewing the images and labs results with the patient, and answering all questions and explaining in details the assessment and recommendation sections above. The patient expressed their understanding and agreement to the above.        This EM service was conducted utilizing secure and encrypted videoconferencing equipment with the assistance of a trained tele-presenter at the originating site.       Franko Green MD  Nephrology  [facility]

## 2023-05-22 DIAGNOSIS — J34.89 RHINORRHEA: ICD-10-CM

## 2023-05-22 DIAGNOSIS — N18.4 CKD (CHRONIC KIDNEY DISEASE) STAGE 4, GFR 15-29 ML/MIN (HCC): ICD-10-CM

## 2023-05-22 DIAGNOSIS — E11.21 TYPE 2 DIABETES MELLITUS WITH DIABETIC NEPHROPATHY, WITHOUT LONG-TERM CURRENT USE OF INSULIN (HCC): ICD-10-CM

## 2023-05-22 DIAGNOSIS — I10 ESSENTIAL HYPERTENSION: ICD-10-CM

## 2023-05-22 DIAGNOSIS — E78.5 DYSLIPIDEMIA: ICD-10-CM

## 2023-05-22 RX ORDER — AMLODIPINE BESYLATE 10 MG/1
10 TABLET ORAL DAILY
Qty: 90 TABLET | Refills: 3 | Status: CANCELLED | OUTPATIENT
Start: 2023-05-22

## 2023-05-22 RX ORDER — AZELASTINE 1 MG/ML
1 SPRAY, METERED NASAL 2 TIMES DAILY PRN
Qty: 30 ML | Refills: 1 | Status: CANCELLED | OUTPATIENT
Start: 2023-05-22

## 2023-05-22 RX ORDER — LOSARTAN POTASSIUM 100 MG/1
100 TABLET ORAL DAILY
Qty: 90 TABLET | Refills: 3 | Status: SHIPPED | OUTPATIENT
Start: 2023-05-22 | End: 2023-05-25

## 2023-05-22 RX ORDER — DAPAGLIFLOZIN 5 MG/1
5 TABLET, FILM COATED ORAL DAILY
Qty: 90 TABLET | Refills: 3 | Status: SHIPPED | OUTPATIENT
Start: 2023-05-22

## 2023-05-22 RX ORDER — LOSARTAN POTASSIUM 100 MG/1
100 TABLET ORAL DAILY
Qty: 90 TABLET | Refills: 3 | Status: SHIPPED | OUTPATIENT
Start: 2023-05-22 | End: 2023-07-27 | Stop reason: SDUPTHER

## 2023-05-22 RX ORDER — ATORVASTATIN CALCIUM 80 MG/1
80 TABLET, FILM COATED ORAL DAILY
Qty: 90 TABLET | Refills: 3 | Status: CANCELLED | OUTPATIENT
Start: 2023-05-22

## 2023-05-22 RX ORDER — METOPROLOL SUCCINATE 50 MG/1
50 TABLET, EXTENDED RELEASE ORAL DAILY
Qty: 90 TABLET | Refills: 3 | Status: CANCELLED | OUTPATIENT
Start: 2023-05-22

## 2023-05-25 ENCOUNTER — OFFICE VISIT (OUTPATIENT)
Dept: MEDICAL GROUP | Facility: PHYSICIAN GROUP | Age: 88
End: 2023-05-25
Payer: COMMERCIAL

## 2023-05-25 VITALS
RESPIRATION RATE: 16 BRPM | BODY MASS INDEX: 24.26 KG/M2 | DIASTOLIC BLOOD PRESSURE: 60 MMHG | WEIGHT: 123.6 LBS | HEIGHT: 60 IN | OXYGEN SATURATION: 99 % | TEMPERATURE: 98.4 F | HEART RATE: 78 BPM | SYSTOLIC BLOOD PRESSURE: 130 MMHG

## 2023-05-25 DIAGNOSIS — E11.21 TYPE 2 DIABETES MELLITUS WITH DIABETIC NEPHROPATHY, WITHOUT LONG-TERM CURRENT USE OF INSULIN (HCC): Chronic | ICD-10-CM

## 2023-05-25 DIAGNOSIS — D63.8 ANEMIA OF CHRONIC DISEASE: ICD-10-CM

## 2023-05-25 DIAGNOSIS — I10 ESSENTIAL HYPERTENSION: Chronic | ICD-10-CM

## 2023-05-25 LAB
HBA1C MFR BLD: 7.4 % (ref ?–5.8)
POCT INT CON NEG: NEGATIVE
POCT INT CON POS: POSITIVE

## 2023-05-25 PROCEDURE — 99214 OFFICE O/P EST MOD 30 MIN: CPT | Performed by: NURSE PRACTITIONER

## 2023-05-25 PROCEDURE — 3078F DIAST BP <80 MM HG: CPT | Performed by: NURSE PRACTITIONER

## 2023-05-25 PROCEDURE — 3075F SYST BP GE 130 - 139MM HG: CPT | Performed by: NURSE PRACTITIONER

## 2023-05-25 PROCEDURE — 83036 HEMOGLOBIN GLYCOSYLATED A1C: CPT | Performed by: NURSE PRACTITIONER

## 2023-05-25 ASSESSMENT — FIBROSIS 4 INDEX: FIB4 SCORE: 1.66

## 2023-05-25 NOTE — ASSESSMENT & PLAN NOTE
This is a chronic health problem that is well controlled with current medications and lifestyle measures.  Point-of-care A1c is 7.4%.  This is down from 7.8% at last appointment.  Taking Farxiga 5 mg daily.  On statin and ARB.  Has upcoming appointment with optometry at [facility] for retinal scan.  Denies polyuria, polydipsia, vision changes.

## 2023-05-25 NOTE — ASSESSMENT & PLAN NOTE
This is a chronic health problem that is well controlled with current medications and lifestyle measures.  Taking losartan 100 mg daily and metoprolol 50 mg daily.  The patient denies chest pain, shortness of breath, headache, vision changes, epistaxis, or dyspnea on exertion.

## 2023-05-25 NOTE — PROGRESS NOTES
CC: Diabetes    HISTORY OF THE PRESENT ILLNESS: Patient is a 87 y.o. female. This pleasant patient is here today for evaluation and management of the following health problems.        Type 2 diabetes mellitus with diabetic nephropathy, without long-term current use of insulin (HCC)  This is a chronic health problem that is well controlled with current medications and lifestyle measures.  Point-of-care A1c is 7.4%.  This is down from 7.8% at last appointment.  Taking Farxiga 5 mg daily.  On statin and ARB.  Has upcoming appointment with optometry at [facility] for retinal scan.  Denies polyuria, polydipsia, vision changes.      Essential hypertension  This is a chronic health problem that is well controlled with current medications and lifestyle measures.  Taking losartan 100 mg daily and metoprolol 50 mg daily.  The patient denies chest pain, shortness of breath, headache, vision changes, epistaxis, or dyspnea on exertion.      Anemia of chronic disease  Chronic in nature.  Managed by nephrology.  Will be having repeat CBC in a couple weeks.  Does report fatigue.  Denies shortness of breath, chest pain, lightheadedness.    Allergies: Patient has no known allergies.    Current Outpatient Medications Ordered in Epic   Medication Sig Dispense Refill    losartan (COZAAR) 100 MG Tab Take 1 Tablet by mouth every day. 90 Tablet 3    dapagliflozin propanediol (FARXIGA) 5 MG Tab Take 1 Tablet by mouth every day. 90 Tablet 3    amLODIPine (NORVASC) 10 MG Tab Take 1 Tablet by mouth every day. 90 Tablet 3    azelastine (ASTELIN) 137 MCG/SPRAY nasal spray Administer 1 Spray into affected nostril(S) 2 times a day as needed for Rhinitis. 30 mL 1    vitamin D2, Ergocalciferol, (DRISDOL) 1.25 MG (68126 UT) Cap capsule Take 1 Capsule by mouth every 7 days. 12 Capsule 0    atorvastatin (LIPITOR) 80 MG tablet Take 1 Tablet by mouth every day. 90 Tablet 3    metoprolol SR (TOPROL XL) 50 MG TABLET SR 24 HR Take 1 Tablet by  mouth every day. 90 Tablet 3    BIOTIN 5000 PO Take  by mouth.      Cholecalciferol (D3-1000) 1000 UNIT Cap Take  by mouth.      Cyanocobalamin (B-12) 1000 MCG Cap Take  by mouth.      Omega-3 Fatty Acids (OMEGA 3 PO) Take  by mouth.      Multiple Vitamins-Minerals (WOMENS MULTIVITAMIN PO) Take  by mouth.       No current Epic-ordered facility-administered medications on file.       Past Medical History:   Diagnosis Date    Arthritis     Blood transfusion without reported diagnosis     Cataract     Diabetes (HCC)     Kidney disease        Past Surgical History:   Procedure Laterality Date    BLADDER SLING FEMALE      CATARACT EXTRACTION WITH IOL         Social History     Tobacco Use    Smoking status: Former     Years: .00     Types: Cigarettes     Quit date:      Years since quittin.4    Smokeless tobacco: Never   Vaping Use    Vaping Use: Never used   Substance Use Topics    Alcohol use: Not Currently    Drug use: Never       Family History   Problem Relation Age of Onset    Heart Disease Mother     Stroke Mother     Colon Cancer Father     Hypertension Sister     Heart Disease Brother     Kidney Disease Neg Hx        ROS: As in HPI, otherwise negative for chest pain, dyspnea, abdominal pain, dysuria, blood in stool, fever         Exam: /60   Pulse 78   Temp 36.9 °C (98.4 °F) (Temporal)   Resp 16   Ht 1.524 m (5')   Wt 56.1 kg (123 lb 9.6 oz)   SpO2 99%  Body mass index is 24.14 kg/m².    General: Alert, pleasant, well nourished, well developed female in NAD  HEENT: Normocephalic. Eyes conjunctiva clear lids without ptosis, pupils equal and reactive to light, ears normal shape and contour, canals are clear bilaterally, tympanic membranes are pearly gray with good light reflex, nasal mucosa without erythema and drainage, oropharynx is without erythema, edema or exudates.   Neck: Supple without bruit. Thyroid is not enlarged.  Pulmonary: Clear to ausculation.  Normal effort. No rales, ronchi,  or wheezing.  Cardiovascular: Normal rate and rhythm without murmur.   Abdomen: Soft, nontender, nondistended.   Neurologic: Grossly nonfocal  Lymph: No cervical or supraclavicular lymph nodes are palpable  Skin: Warm and dry.    Musculoskeletal: slow gait.   Psych: Normal mood and affect. Alert and oriented. Judgment and insight is normal.    Please note that this dictation was created using voice recognition software. I have made every reasonable attempt to correct obvious errors, but I expect that there are errors of grammar and possibly content that I did not discover before finalizing the note.      Assessment/Plan  1. Type 2 diabetes mellitus with diabetic nephropathy, without long-term current use of insulin (HCC)  Well-controlled.  Continue with Farxiga.  Reviewed lifestyle measures including diabetic diet.  Keep upcoming appointment with optometry.  - POCT  A1C    2. Essential hypertension  Well-controlled.  Continue with losartan and metoprolol.  Advised on DASH diet.    3. Anemia of chronic disease  Asymptomatic.  However heart murmur may be related to anemia.  Patient will get follow-up labs done as ordered by nephrology.      Patient will return to clinic in 3 months or sooner if needed.  We will get point-of-care A1c at that appointment.

## 2023-05-25 NOTE — ASSESSMENT & PLAN NOTE
Chronic in nature.  Managed by nephrology.  Will be having repeat CBC in a couple weeks.  Does report fatigue.  Denies shortness of breath, chest pain, lightheadedness.

## 2023-06-08 ENCOUNTER — HOSPITAL ENCOUNTER (OUTPATIENT)
Dept: LAB | Facility: MEDICAL CENTER | Age: 88
End: 2023-06-08
Attending: INTERNAL MEDICINE
Payer: COMMERCIAL

## 2023-06-08 DIAGNOSIS — D63.8 ANEMIA OF CHRONIC DISEASE: ICD-10-CM

## 2023-06-08 DIAGNOSIS — N18.4 CKD (CHRONIC KIDNEY DISEASE) STAGE 4, GFR 15-29 ML/MIN (HCC): ICD-10-CM

## 2023-06-08 DIAGNOSIS — E11.21 TYPE 2 DIABETES MELLITUS WITH DIABETIC NEPHROPATHY, WITHOUT LONG-TERM CURRENT USE OF INSULIN (HCC): Chronic | ICD-10-CM

## 2023-06-08 DIAGNOSIS — N25.81 HYPERPARATHYROIDISM DUE TO RENAL INSUFFICIENCY (HCC): ICD-10-CM

## 2023-06-08 LAB
APPEARANCE UR: CLEAR
BACTERIA #/AREA URNS HPF: NEGATIVE /HPF
BILIRUB UR QL STRIP.AUTO: NEGATIVE
COLOR UR: YELLOW
CREAT UR-MCNC: 49.67 MG/DL
CREAT UR-MCNC: 50.7 MG/DL
EPI CELLS #/AREA URNS HPF: NEGATIVE /HPF
ERYTHROCYTE [DISTWIDTH] IN BLOOD BY AUTOMATED COUNT: 41.7 FL (ref 35.9–50)
FERRITIN SERPL-MCNC: 235 NG/ML (ref 10–291)
GLUCOSE UR STRIP.AUTO-MCNC: >=1000 MG/DL
HCT VFR BLD AUTO: 31.6 % (ref 37–47)
HGB BLD-MCNC: 10.2 G/DL (ref 12–16)
HYALINE CASTS #/AREA URNS LPF: ABNORMAL /LPF
KETONES UR STRIP.AUTO-MCNC: NEGATIVE MG/DL
LEUKOCYTE ESTERASE UR QL STRIP.AUTO: ABNORMAL
MCH RBC QN AUTO: 30.3 PG (ref 27–33)
MCHC RBC AUTO-ENTMCNC: 32.3 G/DL (ref 32.2–35.5)
MCV RBC AUTO: 93.8 FL (ref 81.4–97.8)
MICRO URNS: ABNORMAL
MICROALBUMIN UR-MCNC: 97 MG/DL
MICROALBUMIN/CREAT UR: 1953 MG/G (ref 0–30)
NITRITE UR QL STRIP.AUTO: NEGATIVE
PH UR STRIP.AUTO: 6 [PH] (ref 5–8)
PLATELET # BLD AUTO: 268 K/UL (ref 164–446)
PMV BLD AUTO: 9 FL (ref 9–12.9)
PROT UR QL STRIP: 300 MG/DL
PROT UR-MCNC: 147 MG/DL (ref 0–15)
PROT/CREAT UR: 2899 MG/G (ref 10–107)
RBC # BLD AUTO: 3.37 M/UL (ref 4.2–5.4)
RBC # URNS HPF: ABNORMAL /HPF
RBC UR QL AUTO: NEGATIVE
SP GR UR STRIP.AUTO: 1.02
UROBILINOGEN UR STRIP.AUTO-MCNC: 0.2 MG/DL
WBC # BLD AUTO: 6.2 K/UL (ref 4.8–10.8)
WBC #/AREA URNS HPF: ABNORMAL /HPF

## 2023-06-08 PROCEDURE — 83550 IRON BINDING TEST: CPT

## 2023-06-08 PROCEDURE — 82043 UR ALBUMIN QUANTITATIVE: CPT

## 2023-06-08 PROCEDURE — 81001 URINALYSIS AUTO W/SCOPE: CPT

## 2023-06-08 PROCEDURE — 80048 BASIC METABOLIC PNL TOTAL CA: CPT

## 2023-06-08 PROCEDURE — 83540 ASSAY OF IRON: CPT

## 2023-06-08 PROCEDURE — 84100 ASSAY OF PHOSPHORUS: CPT

## 2023-06-08 PROCEDURE — 36415 COLL VENOUS BLD VENIPUNCTURE: CPT

## 2023-06-08 PROCEDURE — 84156 ASSAY OF PROTEIN URINE: CPT

## 2023-06-08 PROCEDURE — 82728 ASSAY OF FERRITIN: CPT

## 2023-06-08 PROCEDURE — 85027 COMPLETE CBC AUTOMATED: CPT

## 2023-06-08 PROCEDURE — 82040 ASSAY OF SERUM ALBUMIN: CPT

## 2023-06-08 PROCEDURE — 82570 ASSAY OF URINE CREATININE: CPT | Mod: 91

## 2023-06-09 LAB
ALBUMIN SERPL BCP-MCNC: 4.2 G/DL (ref 3.2–4.9)
ANION GAP SERPL CALC-SCNC: 13 MMOL/L (ref 7–16)
BUN SERPL-MCNC: 47 MG/DL (ref 8–22)
CALCIUM SERPL-MCNC: 9.2 MG/DL (ref 8.5–10.5)
CHLORIDE SERPL-SCNC: 106 MMOL/L (ref 96–112)
CO2 SERPL-SCNC: 21 MMOL/L (ref 20–33)
CREAT SERPL-MCNC: 3 MG/DL (ref 0.5–1.4)
GFR SERPLBLD CREATININE-BSD FMLA CKD-EPI: 15 ML/MIN/1.73 M 2
GLUCOSE SERPL-MCNC: 163 MG/DL (ref 65–99)
IRON SATN MFR SERPL: 20 % (ref 15–55)
IRON SERPL-MCNC: 48 UG/DL (ref 40–170)
PHOSPHATE SERPL-MCNC: 3.7 MG/DL (ref 2.5–4.5)
POTASSIUM SERPL-SCNC: 5 MMOL/L (ref 3.6–5.5)
SODIUM SERPL-SCNC: 140 MMOL/L (ref 135–145)
TIBC SERPL-MCNC: 243 UG/DL (ref 250–450)
UIBC SERPL-MCNC: 195 UG/DL (ref 110–370)

## 2023-07-18 ENCOUNTER — TELEMEDICINE2 (OUTPATIENT)
Dept: NEPHROLOGY | Facility: MEDICAL CENTER | Age: 88
End: 2023-07-18
Payer: COMMERCIAL

## 2023-07-18 VITALS
HEIGHT: 60 IN | OXYGEN SATURATION: 97 % | HEART RATE: 88 BPM | WEIGHT: 125.7 LBS | TEMPERATURE: 97.3 F | BODY MASS INDEX: 24.68 KG/M2 | RESPIRATION RATE: 18 BRPM | SYSTOLIC BLOOD PRESSURE: 126 MMHG | DIASTOLIC BLOOD PRESSURE: 50 MMHG

## 2023-07-18 DIAGNOSIS — N18.4 CKD (CHRONIC KIDNEY DISEASE) STAGE 4, GFR 15-29 ML/MIN (HCC): ICD-10-CM

## 2023-07-18 DIAGNOSIS — I10 ESSENTIAL HYPERTENSION: Chronic | ICD-10-CM

## 2023-07-18 DIAGNOSIS — D64.9 ANEMIA, UNSPECIFIED TYPE: ICD-10-CM

## 2023-07-18 DIAGNOSIS — N25.81 HYPERPARATHYROIDISM DUE TO RENAL INSUFFICIENCY (HCC): ICD-10-CM

## 2023-07-18 DIAGNOSIS — E11.21 TYPE 2 DIABETES MELLITUS WITH DIABETIC NEPHROPATHY, WITHOUT LONG-TERM CURRENT USE OF INSULIN (HCC): Chronic | ICD-10-CM

## 2023-07-18 PROCEDURE — 3078F DIAST BP <80 MM HG: CPT | Mod: 95 | Performed by: INTERNAL MEDICINE

## 2023-07-18 PROCEDURE — 99214 OFFICE O/P EST MOD 30 MIN: CPT | Mod: 95 | Performed by: INTERNAL MEDICINE

## 2023-07-18 PROCEDURE — 3074F SYST BP LT 130 MM HG: CPT | Mod: 95 | Performed by: INTERNAL MEDICINE

## 2023-07-18 ASSESSMENT — ENCOUNTER SYMPTOMS
SHORTNESS OF BREATH: 0
FEVER: 0
ABDOMINAL PAIN: 0

## 2023-07-18 ASSESSMENT — FIBROSIS 4 INDEX: FIB4 SCORE: 1.63

## 2023-07-18 NOTE — PROGRESS NOTES
"Telemedicine Note     Please note that I could not evaluate the patient in person at the site. All examination and evaluation of the patient and information gathering from the patient and/or the family were done jointly by the requesting physician, CLINT Burns at the site and me via licensed and securely encrypted tele-video communication equipment with the assistance of a trained tele-presenter at the originating site.  As such, my assessments and recommendations are limited as far as such telecommnication allows.     Consulting MD: Franko Green M.D.    Consulting MD location: [place], [place]  Requesting Physician: CLINT Burns   Patient name:      Ivette Castelan  :                    1935   MRN:                   [id number]     Data source:  Patient, granddaughter Carla (not present today)  Video Time:         30 minutes.   Originating site: [place], [place]  Originating site MA: Bertha     Date of Service: 2023      Chief Complaint:   Chief Complaint   Patient presents with    Follow-Up     8 week follow up, [facility] labs         History of Present Illness:    Ivette Castelan is a 87 y.o. female with DM2, HTN, CKD4 who presents today for follow-up.    She moved from [place] in  to [place], [place] to be closer to family.       Re: DM2, diagnosed  when she was overweight. Patient has had microalbuminuria since at least , and macroalbuminuria since 2016. Patient has seen an eye doctor and does not have retinopathy. She has had periods of good control of DM and some periods of poor control. She stopped glipizide and is taking farxiga. She is tolerating farxiga well. Says her sugars are \"pretty good.\"     Re: HTN, diagnosed around . BP control over the years has been fairly controlled. She checks BP at home, usually 120-130's systolic. Denies LE edema.     Re: CKD, she has been seeing a nephrologist in [place], [place] since . She had 3 pregnancies without pre-eclampsia or " "DM. She denies history of RYAN, kidney stone. She can empty her bladder but is incontinent at times. Denies NSAIDs. Her appetite is \"good.\"  Weight is stable 122-125 lbs. Denies daily headache, N/V, metallic taste.            ROS:    Review of Systems   Constitutional:  Negative for fever.   Respiratory:  Negative for shortness of breath.    Cardiovascular:  Negative for chest pain.   Gastrointestinal:  Negative for abdominal pain.   Genitourinary:  Negative for dysuria.   All other systems reviewed and are negative.                  Past Medical History:  Past Medical History:   Diagnosis Date    Arthritis     Blood transfusion without reported diagnosis     Cataract     Diabetes (HCC)     Kidney disease      Past Surgical History:   Procedure Laterality Date    BLADDER SLING FEMALE      CATARACT EXTRACTION WITH IOL          Current Outpatient Medications   Medication Sig Dispense Refill    losartan (COZAAR) 100 MG Tab Take 1 Tablet by mouth every day. 90 Tablet 3    dapagliflozin propanediol (FARXIGA) 5 MG Tab Take 1 Tablet by mouth every day. 90 Tablet 3    amLODIPine (NORVASC) 10 MG Tab Take 1 Tablet by mouth every day. 90 Tablet 3    atorvastatin (LIPITOR) 80 MG tablet Take 1 Tablet by mouth every day. 90 Tablet 3    metoprolol SR (TOPROL XL) 50 MG TABLET SR 24 HR Take 1 Tablet by mouth every day. 90 Tablet 3    BIOTIN 5000 PO Take  by mouth.      Cholecalciferol (D3-1000) 1000 UNIT Cap Take  by mouth.      Cyanocobalamin (B-12) 1000 MCG Cap Take  by mouth.      Omega-3 Fatty Acids (OMEGA 3 PO) Take  by mouth.      Multiple Vitamins-Minerals (WOMENS MULTIVITAMIN PO) Take  by mouth.       No current facility-administered medications for this visit.               Allergies:   No Known Allergies     Current Outpatient Medications:   (Not in a hospital admission)            Physical Exam:   Vitals:   Vitals:    07/18/23 1104   BP: 126/50   BP Location: Left arm   Patient Position: Sitting   BP Cuff Size: Adult "   Pulse: 88   Resp: 18   Temp: 36.3 °C (97.3 °F)   TempSrc: Temporal   SpO2: 97%   Weight: 57 kg (125 lb 11.2 oz)   Height: 1.524 m (5')       GEN: comfortable, in NAD   HEENT: Pupils unable to check.  Hearing appears normal to finger rubs b/l. Unable to check TMs. Moist oral mucosa.   NECK: appears supple, normal cervical ROM. Unable to check for thyroid or lymphadenopathy.  CV: Skin pink and well perfused, warm per site MA. No cardiac murmur, no LE edema per site MA.   PULM: normal work of breathing. No audible wheeze. Lungs clear per site MA.   ABD: appears soft, nontender, not distended.  Ext: unable to check but site MA reports 2+ pedal pulses.  SKIN: No obvious rash or lesion noted.   Psychiatric: normal mood and affect, alert and oriented to self, place, person and time.            Imaging reviewed & Visualized by me:  DIAGNOSTIC IMAGING REVIEWED AND/OR INTERPRETED BY ME:        Laboratory:   Recent Labs     11/09/22  0857 12/07/22  1045 03/09/23  1133 05/05/23  1307 06/08/23  1111   ALBUMIN 4.0   < > 4.2 4.2 4.2   HDL 60  --   --   --   --    TRIGLYCERIDE 137  --   --   --   --    SODIUM 139   < > 138 143 140   POTASSIUM 4.4   < > 4.7 4.9 5.0   CHLORIDE 105   < > 104 110 106   CO2 22   < > 24 20 21   BUN 43*   < > 46* 60* 47*   CREATININE 2.37*   < > 2.94* 3.00* 3.00*   PHOSPHORUS  --    < > 4.1 3.9 3.7    < > = values in this interval not displayed.     Lab Results   Component Value Date/Time    WBC 6.2 06/08/2023 11:11 AM    RBC 3.37 (L) 06/08/2023 11:11 AM    HEMOGLOBIN 10.2 (L) 06/08/2023 11:11 AM    HEMATOCRIT 31.6 (L) 06/08/2023 11:11 AM    MCV 93.8 06/08/2023 11:11 AM    MCH 30.3 06/08/2023 11:11 AM    MCHC 32.3 06/08/2023 11:11 AM    MPV 9.0 06/08/2023 11:11 AM        URINALYSIS:  Lab Results   Component Value Date/Time    COLORURINE Yellow 06/08/2023 1112    CLARITY Clear 06/08/2023 1112    SPECGRAVITY 1.017 06/08/2023 1112    PHURINE 6.0 06/08/2023 1112    KETONES Negative 06/08/2023 1112     PROTEINURIN 300 (A) 06/08/2023 1112    BILIRUBINUR Negative 06/08/2023 1112    UROBILU 0.2 06/08/2023 1112    NITRITE Negative 06/08/2023 1112    LEUKESTERAS Small (A) 06/08/2023 1112    OCCULTBLOOD Negative 06/08/2023 1112       [facility]  Lab Results   Component Value Date/Time    TOTPROTUR 147.0 (H) 06/08/2023 1112      Lab Results   Component Value Date/Time    CREATININEU 50.70 06/08/2023 1112             [unfilled]       Assessment and Plan:   Ivette Castelan is a 87 y.o. female with DM2, HTN, CKD4 who presents today for follow-up.    1. CKD (chronic kidney disease) stage 4, GFR 15-29 ml/min ([facility])  -Creatinine and GFR remained stable in stage IV CKD, but flirting with stage V CKD.    Underlying CKD likely from diabetic nephropathy, hypertension, age-related kidney decline.  Recommend avoid NSAIDs and other nephrotoxins.  Recommend low-sodium diet.  I explained the importance of blood pressure and glycemic control to help slow CKD progression.  Maintain losartan and Jardiance for long-term kidney protection.  She is at high risk of CKD progression to ESRD with her macroalbuminuria.  She continues to think she would try hemodialysis, and I would recommend catheter-based a trial of hemodialysis for 2 to 4 weeks if or when that time comes.  Fortunately, no acute need for dialysis at this time.    2. Type 2 diabetes mellitus with diabetic nephropathy, without long-term current use of insulin ([facility])  -Controlled per patient.  Continue Farxiga and losartan.  Defer further diabetes management to primary care.    3. Essential hypertension  -Mildly uncontrolled.  Continue losartan, amlodipine, metoprolol.  If hypertension remains uncontrolled, I would recommend starting Lasix 80 mg p.o. daily.    4. Anemia, unspecified type  -Persists, but with hemoglobin above 10.  Patient is iron replete.  No acute need for ALYCE injections unless hemoglobin consistently is below 10.          Return to clinic 2 months with pre-clinic      I spent  total of 30 minutes on face-to-face via telemonitor more than half of which was spent coordinating this with patient as well as reviewing the images and labs results with the patient, and answering all questions and explaining in details the assessment and recommendation sections above. The patient expressed their understanding and agreement to the above.        This EM service was conducted utilizing secure and encrypted videoconferencing equipment with the assistance of a trained tele-presenter at the originating site.       Franko Green MD  Nephrology  [facility]

## 2023-07-27 DIAGNOSIS — E78.5 DYSLIPIDEMIA: ICD-10-CM

## 2023-07-27 DIAGNOSIS — I10 ESSENTIAL HYPERTENSION: ICD-10-CM

## 2023-07-27 RX ORDER — LOSARTAN POTASSIUM 100 MG/1
100 TABLET ORAL DAILY
Qty: 90 TABLET | Refills: 3 | Status: SHIPPED | OUTPATIENT
Start: 2023-07-27

## 2023-07-27 NOTE — TELEPHONE ENCOUNTER
Last ov 5/25/23    Received request via: Patient    Was the patient seen in the last year in this department? Yes    Does the patient have an active prescription (recently filled or refills available) for medication(s) requested?       Does the patient have [facility] Plus and need 100 day supply (blood pressure, diabetes and cholesterol meds only)? Patient does not have SCP

## 2023-07-28 RX ORDER — AMLODIPINE BESYLATE 10 MG/1
10 TABLET ORAL DAILY
Qty: 90 TABLET | Refills: 3 | Status: SHIPPED | OUTPATIENT
Start: 2023-07-28

## 2023-07-28 RX ORDER — METOPROLOL SUCCINATE 50 MG/1
50 TABLET, EXTENDED RELEASE ORAL DAILY
Qty: 90 TABLET | Refills: 3 | Status: SHIPPED | OUTPATIENT
Start: 2023-07-28

## 2023-07-28 RX ORDER — ATORVASTATIN CALCIUM 80 MG/1
80 TABLET, FILM COATED ORAL DAILY
Qty: 90 TABLET | Refills: 3 | Status: SHIPPED | OUTPATIENT
Start: 2023-07-28

## 2023-09-01 ENCOUNTER — HOSPITAL ENCOUNTER (OUTPATIENT)
Dept: LAB | Facility: MEDICAL CENTER | Age: 88
End: 2023-09-01
Attending: INTERNAL MEDICINE
Payer: COMMERCIAL

## 2023-09-01 DIAGNOSIS — D64.9 ANEMIA, UNSPECIFIED TYPE: ICD-10-CM

## 2023-09-01 DIAGNOSIS — N25.81 HYPERPARATHYROIDISM DUE TO RENAL INSUFFICIENCY (HCC): ICD-10-CM

## 2023-09-01 DIAGNOSIS — E11.21 TYPE 2 DIABETES MELLITUS WITH DIABETIC NEPHROPATHY, WITHOUT LONG-TERM CURRENT USE OF INSULIN (HCC): Chronic | ICD-10-CM

## 2023-09-01 DIAGNOSIS — N18.4 CKD (CHRONIC KIDNEY DISEASE) STAGE 4, GFR 15-29 ML/MIN (HCC): ICD-10-CM

## 2023-09-01 LAB
25(OH)D3 SERPL-MCNC: 43 NG/ML (ref 30–100)
ALBUMIN SERPL BCP-MCNC: 4.5 G/DL (ref 3.2–4.9)
ANION GAP SERPL CALC-SCNC: 13 MMOL/L (ref 7–16)
APPEARANCE UR: CLEAR
BACTERIA #/AREA URNS HPF: NEGATIVE /HPF
BILIRUB UR QL STRIP.AUTO: NEGATIVE
BUN SERPL-MCNC: 51 MG/DL (ref 8–22)
CALCIUM SERPL-MCNC: 9.2 MG/DL (ref 8.5–10.5)
CHLORIDE SERPL-SCNC: 103 MMOL/L (ref 96–112)
CO2 SERPL-SCNC: 24 MMOL/L (ref 20–33)
COLOR UR: YELLOW
CREAT SERPL-MCNC: 3.06 MG/DL (ref 0.5–1.4)
EPI CELLS #/AREA URNS HPF: NEGATIVE /HPF
ERYTHROCYTE [DISTWIDTH] IN BLOOD BY AUTOMATED COUNT: 41.5 FL (ref 35.9–50)
FERRITIN SERPL-MCNC: 177 NG/ML (ref 10–291)
GFR SERPLBLD CREATININE-BSD FMLA CKD-EPI: 14 ML/MIN/1.73 M 2
GLUCOSE SERPL-MCNC: 151 MG/DL (ref 65–99)
GLUCOSE UR STRIP.AUTO-MCNC: 500 MG/DL
HCT VFR BLD AUTO: 33.2 % (ref 37–47)
HGB BLD-MCNC: 10.9 G/DL (ref 12–16)
HYALINE CASTS #/AREA URNS LPF: NORMAL /LPF
IRON SATN MFR SERPL: 28 % (ref 15–55)
IRON SERPL-MCNC: 71 UG/DL (ref 40–170)
KETONES UR STRIP.AUTO-MCNC: NEGATIVE MG/DL
LEUKOCYTE ESTERASE UR QL STRIP.AUTO: NEGATIVE
MCH RBC QN AUTO: 30.4 PG (ref 27–33)
MCHC RBC AUTO-ENTMCNC: 32.8 G/DL (ref 32.2–35.5)
MCV RBC AUTO: 92.5 FL (ref 81.4–97.8)
MICRO URNS: ABNORMAL
NITRITE UR QL STRIP.AUTO: NEGATIVE
PH UR STRIP.AUTO: 6.5 [PH] (ref 5–8)
PHOSPHATE SERPL-MCNC: 5.2 MG/DL (ref 2.5–4.5)
PLATELET # BLD AUTO: 245 K/UL (ref 164–446)
PMV BLD AUTO: 9.1 FL (ref 9–12.9)
POTASSIUM SERPL-SCNC: 4.5 MMOL/L (ref 3.6–5.5)
PROT UR QL STRIP: 300 MG/DL
PTH-INTACT SERPL-MCNC: 242 PG/ML (ref 14–72)
RBC # BLD AUTO: 3.59 M/UL (ref 4.2–5.4)
RBC # URNS HPF: NORMAL /HPF
RBC UR QL AUTO: NEGATIVE
SODIUM SERPL-SCNC: 140 MMOL/L (ref 135–145)
SP GR UR STRIP.AUTO: 1.02
TIBC SERPL-MCNC: 256 UG/DL (ref 250–450)
UIBC SERPL-MCNC: 185 UG/DL (ref 110–370)
UROBILINOGEN UR STRIP.AUTO-MCNC: 0.2 MG/DL
WBC # BLD AUTO: 5.5 K/UL (ref 4.8–10.8)
WBC #/AREA URNS HPF: NORMAL /HPF

## 2023-09-01 PROCEDURE — 82570 ASSAY OF URINE CREATININE: CPT | Mod: 91

## 2023-09-01 PROCEDURE — 82728 ASSAY OF FERRITIN: CPT

## 2023-09-01 PROCEDURE — 83550 IRON BINDING TEST: CPT

## 2023-09-01 PROCEDURE — 82040 ASSAY OF SERUM ALBUMIN: CPT

## 2023-09-01 PROCEDURE — 80048 BASIC METABOLIC PNL TOTAL CA: CPT

## 2023-09-01 PROCEDURE — 36415 COLL VENOUS BLD VENIPUNCTURE: CPT

## 2023-09-01 PROCEDURE — 82306 VITAMIN D 25 HYDROXY: CPT

## 2023-09-01 PROCEDURE — 85027 COMPLETE CBC AUTOMATED: CPT

## 2023-09-01 PROCEDURE — 82043 UR ALBUMIN QUANTITATIVE: CPT

## 2023-09-01 PROCEDURE — 84100 ASSAY OF PHOSPHORUS: CPT

## 2023-09-01 PROCEDURE — 84156 ASSAY OF PROTEIN URINE: CPT

## 2023-09-01 PROCEDURE — 81001 URINALYSIS AUTO W/SCOPE: CPT

## 2023-09-01 PROCEDURE — 83970 ASSAY OF PARATHORMONE: CPT

## 2023-09-01 PROCEDURE — 83540 ASSAY OF IRON: CPT

## 2023-09-02 LAB
CREAT UR-MCNC: 44.24 MG/DL
CREAT UR-MCNC: 46.24 MG/DL
MICROALBUMIN UR-MCNC: 172.5 MG/DL
MICROALBUMIN/CREAT UR: 3899 MG/G (ref 0–30)
PROT UR-MCNC: 240 MG/DL (ref 0–15)
PROT/CREAT UR: 5190 MG/G (ref 10–107)

## 2023-09-12 ENCOUNTER — TELEMEDICINE (OUTPATIENT)
Dept: NEPHROLOGY | Facility: MEDICAL CENTER | Age: 88
End: 2023-09-12
Payer: COMMERCIAL

## 2023-09-12 VITALS
DIASTOLIC BLOOD PRESSURE: 66 MMHG | HEIGHT: 60 IN | WEIGHT: 126 LBS | BODY MASS INDEX: 24.74 KG/M2 | SYSTOLIC BLOOD PRESSURE: 114 MMHG

## 2023-09-12 DIAGNOSIS — I10 ESSENTIAL HYPERTENSION: Chronic | ICD-10-CM

## 2023-09-12 DIAGNOSIS — N25.81 HYPERPARATHYROIDISM DUE TO RENAL INSUFFICIENCY (HCC): ICD-10-CM

## 2023-09-12 DIAGNOSIS — Z87.440 HISTORY OF UTI: ICD-10-CM

## 2023-09-12 DIAGNOSIS — N18.4 CKD (CHRONIC KIDNEY DISEASE) STAGE 4, GFR 15-29 ML/MIN (HCC): ICD-10-CM

## 2023-09-12 DIAGNOSIS — E11.21 TYPE 2 DIABETES MELLITUS WITH DIABETIC NEPHROPATHY, WITHOUT LONG-TERM CURRENT USE OF INSULIN (HCC): Chronic | ICD-10-CM

## 2023-09-12 DIAGNOSIS — D63.8 ANEMIA OF CHRONIC DISEASE: ICD-10-CM

## 2023-09-12 PROCEDURE — 99214 OFFICE O/P EST MOD 30 MIN: CPT | Mod: 95 | Performed by: INTERNAL MEDICINE

## 2023-09-12 ASSESSMENT — ENCOUNTER SYMPTOMS
FEVER: 0
SHORTNESS OF BREATH: 0
ABDOMINAL PAIN: 0

## 2023-09-12 ASSESSMENT — FIBROSIS 4 INDEX: FIB4 SCORE: 1.78

## 2023-09-12 NOTE — PROGRESS NOTES
"Chief Complaint:   Chief Complaint   Patient presents with    Follow-Up     CKD (chronic kidney disease) stage 4, GFR 15-29 ml/min ([facility]), [facility] labs done.     This evaluation was conducted via Zoom using secure and encrypted videoconferencing technology. The patient was in a private location outside of their home in the [facility].    The patient's identity was confirmed and verbal consent was obtained for this virtual visit.       History of Present Illness:    Ivette Castelan is a 87 y.o. female with DM2, HTN, CKD4 who presents today for follow-up.    She moved from [place] in June, 2022 to [place], [place] to be closer to family.     She says, \"I feel great.\"     Re: DM2, diagnosed 1975 when she was overweight. Patient has had microalbuminuria since at least 2010, and macroalbuminuria since 2016. Patient has seen an eye doctor and does not have retinopathy. She has had periods of good control of DM and some periods of poor control. She is still tolerating farxiga well. Says her sugars are averaging 119 over 30 days.     Re: HTN, diagnosed around 1975. BP control over the years has been fairly controlled. She checks BP at home, averages 114/66. Denies LE edema.     Re: CKD, she has been seeing a nephrologist in [place], [place] since 2022. She had 3 pregnancies without pre-eclampsia or DM. She denies history of RYAN, kidney stone. She can empty her bladder but is incontinent at times. Denies NSAIDs. Her appetite is \"really good.\"  Weight fluctuates around 125 lbs. Denies daily headache, N/V, metallic taste.            ROS:    Review of Systems   Constitutional:  Negative for fever.   Respiratory:  Negative for shortness of breath.    Cardiovascular:  Negative for chest pain.   Gastrointestinal:  Negative for abdominal pain.   Genitourinary:  Negative for dysuria.   All other systems reviewed and are negative.                  Past Medical History:  Past Medical History:   Diagnosis Date    Arthritis     Blood transfusion " without reported diagnosis     Cataract     Diabetes (HCC)     Kidney disease      Past Surgical History:   Procedure Laterality Date    BLADDER SLING FEMALE      CATARACT EXTRACTION WITH IOL          Current Outpatient Medications   Medication Sig Dispense Refill    metoprolol SR (TOPROL XL) 50 MG TABLET SR 24 HR Take 1 Tablet by mouth every day. 90 Tablet 3    atorvastatin (LIPITOR) 80 MG tablet Take 1 Tablet by mouth every day. 90 Tablet 3    amLODIPine (NORVASC) 10 MG Tab Take 1 Tablet by mouth every day. 90 Tablet 3    losartan (COZAAR) 100 MG Tab Take 1 Tablet by mouth every day. 90 Tablet 3    dapagliflozin propanediol (FARXIGA) 5 MG Tab Take 1 Tablet by mouth every day. 90 Tablet 3    BIOTIN 5000 PO Take  by mouth.      Cholecalciferol (D3-1000) 1000 UNIT Cap Take  by mouth.      Cyanocobalamin (B-12) 1000 MCG Cap Take  by mouth.      Omega-3 Fatty Acids (OMEGA 3 PO) Take  by mouth.      Multiple Vitamins-Minerals (WOMENS MULTIVITAMIN PO) Take  by mouth.       No current facility-administered medications for this visit.               Allergies:   No Known Allergies     Current Outpatient Medications:   (Not in a hospital admission)            Physical Exam:   Vitals obtained by patient:  Vitals:    09/12/23 1130   BP: 114/66   BP Location: Left arm   Patient Position: Sitting   BP Cuff Size: Adult   Weight: 57.2 kg (126 lb)   Height: 1.524 m (5')     Heart rate: 80        Physical Exam:  Constitutional: Alert, no distress, well-groomed.  Skin: No rashes in visible areas.  Eye: Round. Conjunctiva clear, lids normal. No icterus.   ENMT: Lips pink without lesions, good dentition, moist mucous membranes. Phonation normal.  Neck: No masses, no thyromegaly. Moves freely without pain.  Respiratory: Unlabored respiratory effort, no cough or audible wheeze  Psych: Alert and oriented x3, normal affect and mood.            Imaging reviewed & Visualized by me:  DIAGNOSTIC IMAGING REVIEWED AND/OR INTERPRETED BY ME:         Laboratory:   Recent Labs     11/09/22  0857 12/07/22  1045 05/05/23  1307 06/08/23  1111 09/01/23  1410   ALBUMIN 4.0   < > 4.2 4.2 4.5   HDL 60  --   --   --   --    TRIGLYCERIDE 137  --   --   --   --    SODIUM 139   < > 143 140 140   POTASSIUM 4.4   < > 4.9 5.0 4.5   CHLORIDE 105   < > 110 106 103   CO2 22   < > 20 21 24   BUN 43*   < > 60* 47* 51*   CREATININE 2.37*   < > 3.00* 3.00* 3.06*   PHOSPHORUS  --    < > 3.9 3.7 5.2*    < > = values in this interval not displayed.     Lab Results   Component Value Date/Time    WBC 5.5 09/01/2023 02:10 PM    RBC 3.59 (L) 09/01/2023 02:10 PM    HEMOGLOBIN 10.9 (L) 09/01/2023 02:10 PM    HEMATOCRIT 33.2 (L) 09/01/2023 02:10 PM    MCV 92.5 09/01/2023 02:10 PM    MCH 30.4 09/01/2023 02:10 PM    MCHC 32.8 09/01/2023 02:10 PM    MPV 9.1 09/01/2023 02:10 PM        URINALYSIS:  Lab Results   Component Value Date/Time    COLORURINE Yellow 09/01/2023 1410    CLARITY Clear 09/01/2023 1410    SPECGRAVITY 1.017 09/01/2023 1410    PHURINE 6.5 09/01/2023 1410    KETONES Negative 09/01/2023 1410    PROTEINURIN 300 (A) 09/01/2023 1410    BILIRUBINUR Negative 09/01/2023 1410    UROBILU 0.2 09/01/2023 1410    NITRITE Negative 09/01/2023 1410    LEUKESTERAS Negative 09/01/2023 1410    OCCULTBLOOD Negative 09/01/2023 1410       [facility]  Lab Results   Component Value Date/Time    TOTPROTUR 240.0 (H) 09/01/2023 1410      Lab Results   Component Value Date/Time    CREATININEU 46.24 09/01/2023 1410                Assessment and Plan:   Ivette Castelan is a 87 y.o. female with DM2, HTN, CKD4 who presents today for follow-up.    1. CKD (chronic kidney disease) stage 4, GFR 15-29 ml/min (HCC)  -Creatinine and GFR now starting to dip into stage V CKD, but not yet in stage V CKD for 3 months.  Her underlying CKD likely from diabetic nephropathy, hypertension, age-related kidney decline.  I recommend avoid NSAIDs and other nephrotoxins.  I recommend low-sodium diet.  I explained the importance of glycemic  and blood pressure control to help slow CKD progression.   Recommend maintain losartan and Jardiance for long-term kidney protection.  She remains at high risk of CKD progression to ESRD with her macroalbuminuria.  She continues to think she would try hemodialysis, and I would recommend catheter-based a trial of hemodialysis for 2 to 4 weeks if or when that time comes.  Fortunately, no acute need for dialysis at this time.  Maintain losartan and Farxiga for long-term kidney protection.  Patient was cautioned of the uremic symptoms she might experience when kidney failure progresses to the point of needing dialysis.    2. Type 2 diabetes mellitus with diabetic nephropathy, without long-term current use of insulin (HCC)  -Controlled per patient.  Continue Farxiga and losartan.  Avoid metformin with GFR less than 30.    3. Essential hypertension  -Well-controlled per patient.  Continue amlodipine, losartan, metoprolol.  If hypertension worsens, or if patient develops lower extremity edema, I would recommend starting Lasix 80 mg p.o. daily.    4. Anemia, unspecified type  -Persists.  Due to anemia of chronic disease.  Patient is iron replete.  No acute need for ALYCE injections with hemoglobin consistently over 10.          Return to clinic 2 months with pre-clinic      Franko Green MD  Nephrology  [facility]

## 2023-10-19 ENCOUNTER — HOSPITAL ENCOUNTER (OUTPATIENT)
Dept: LAB | Facility: MEDICAL CENTER | Age: 88
End: 2023-10-19
Attending: INTERNAL MEDICINE
Payer: COMMERCIAL

## 2023-10-19 DIAGNOSIS — Z87.440 HISTORY OF UTI: ICD-10-CM

## 2023-10-19 DIAGNOSIS — N18.4 CKD (CHRONIC KIDNEY DISEASE) STAGE 4, GFR 15-29 ML/MIN (HCC): ICD-10-CM

## 2023-10-19 DIAGNOSIS — E11.21 TYPE 2 DIABETES MELLITUS WITH DIABETIC NEPHROPATHY, WITHOUT LONG-TERM CURRENT USE OF INSULIN (HCC): Chronic | ICD-10-CM

## 2023-10-19 DIAGNOSIS — D63.8 ANEMIA OF CHRONIC DISEASE: ICD-10-CM

## 2023-10-19 DIAGNOSIS — N25.81 HYPERPARATHYROIDISM DUE TO RENAL INSUFFICIENCY (HCC): ICD-10-CM

## 2023-10-19 LAB
ALBUMIN SERPL BCP-MCNC: 4.2 G/DL (ref 3.2–4.9)
ANION GAP SERPL CALC-SCNC: 11 MMOL/L (ref 7–16)
APPEARANCE UR: CLEAR
BACTERIA #/AREA URNS HPF: ABNORMAL /HPF
BILIRUB UR QL STRIP.AUTO: NEGATIVE
BUN SERPL-MCNC: 45 MG/DL (ref 8–22)
CALCIUM SERPL-MCNC: 9.3 MG/DL (ref 8.5–10.5)
CHLORIDE SERPL-SCNC: 106 MMOL/L (ref 96–112)
CO2 SERPL-SCNC: 24 MMOL/L (ref 20–33)
COLOR UR: YELLOW
CREAT SERPL-MCNC: 3.14 MG/DL (ref 0.5–1.4)
CREAT UR-MCNC: 59.71 MG/DL
CREAT UR-MCNC: 61.18 MG/DL
EPI CELLS #/AREA URNS HPF: NEGATIVE /HPF
ERYTHROCYTE [DISTWIDTH] IN BLOOD BY AUTOMATED COUNT: 41.6 FL (ref 35.9–50)
FERRITIN SERPL-MCNC: 202 NG/ML (ref 10–291)
GFR SERPLBLD CREATININE-BSD FMLA CKD-EPI: 14 ML/MIN/1.73 M 2
GLUCOSE SERPL-MCNC: 174 MG/DL (ref 65–99)
GLUCOSE UR STRIP.AUTO-MCNC: >=1000 MG/DL
HCT VFR BLD AUTO: 32.6 % (ref 37–47)
HGB BLD-MCNC: 10.4 G/DL (ref 12–16)
HYALINE CASTS #/AREA URNS LPF: ABNORMAL /LPF
IRON SATN MFR SERPL: 30 % (ref 15–55)
IRON SERPL-MCNC: 66 UG/DL (ref 40–170)
KETONES UR STRIP.AUTO-MCNC: NEGATIVE MG/DL
LEUKOCYTE ESTERASE UR QL STRIP.AUTO: NEGATIVE
MCH RBC QN AUTO: 29.8 PG (ref 27–33)
MCHC RBC AUTO-ENTMCNC: 31.9 G/DL (ref 32.2–35.5)
MCV RBC AUTO: 93.4 FL (ref 81.4–97.8)
MICRO URNS: ABNORMAL
MICROALBUMIN UR-MCNC: 146.8 MG/DL
MICROALBUMIN/CREAT UR: 2459 MG/G (ref 0–30)
NITRITE UR QL STRIP.AUTO: NEGATIVE
PH UR STRIP.AUTO: 6.5 [PH] (ref 5–8)
PHOSPHATE SERPL-MCNC: 4.1 MG/DL (ref 2.5–4.5)
PLATELET # BLD AUTO: 255 K/UL (ref 164–446)
PMV BLD AUTO: 9.1 FL (ref 9–12.9)
POTASSIUM SERPL-SCNC: 4.7 MMOL/L (ref 3.6–5.5)
PROT UR QL STRIP: 300 MG/DL
PROT UR-MCNC: 218 MG/DL (ref 0–15)
PROT/CREAT UR: 3563 MG/G (ref 10–107)
RBC # BLD AUTO: 3.49 M/UL (ref 4.2–5.4)
RBC # URNS HPF: ABNORMAL /HPF
RBC UR QL AUTO: NEGATIVE
SODIUM SERPL-SCNC: 141 MMOL/L (ref 135–145)
SP GR UR STRIP.AUTO: 1.02
TIBC SERPL-MCNC: 221 UG/DL (ref 250–450)
UIBC SERPL-MCNC: 155 UG/DL (ref 110–370)
UROBILINOGEN UR STRIP.AUTO-MCNC: 0.2 MG/DL
WBC # BLD AUTO: 6.3 K/UL (ref 4.8–10.8)
WBC #/AREA URNS HPF: ABNORMAL /HPF

## 2023-10-19 PROCEDURE — 82570 ASSAY OF URINE CREATININE: CPT

## 2023-10-19 PROCEDURE — 82043 UR ALBUMIN QUANTITATIVE: CPT

## 2023-10-19 PROCEDURE — 83540 ASSAY OF IRON: CPT

## 2023-10-19 PROCEDURE — 84156 ASSAY OF PROTEIN URINE: CPT

## 2023-10-19 PROCEDURE — 80048 BASIC METABOLIC PNL TOTAL CA: CPT

## 2023-10-19 PROCEDURE — 87077 CULTURE AEROBIC IDENTIFY: CPT

## 2023-10-19 PROCEDURE — 36415 COLL VENOUS BLD VENIPUNCTURE: CPT

## 2023-10-19 PROCEDURE — 82040 ASSAY OF SERUM ALBUMIN: CPT

## 2023-10-19 PROCEDURE — 85027 COMPLETE CBC AUTOMATED: CPT

## 2023-10-19 PROCEDURE — 87086 URINE CULTURE/COLONY COUNT: CPT

## 2023-10-19 PROCEDURE — 84100 ASSAY OF PHOSPHORUS: CPT

## 2023-10-19 PROCEDURE — 81001 URINALYSIS AUTO W/SCOPE: CPT

## 2023-10-19 PROCEDURE — 87186 SC STD MICRODIL/AGAR DIL: CPT

## 2023-10-19 PROCEDURE — 82728 ASSAY OF FERRITIN: CPT

## 2023-10-19 PROCEDURE — 83550 IRON BINDING TEST: CPT

## 2023-10-22 LAB
BACTERIA UR CULT: ABNORMAL
BACTERIA UR CULT: ABNORMAL
SIGNIFICANT IND 70042: ABNORMAL
SITE SITE: ABNORMAL
SOURCE SOURCE: ABNORMAL

## 2023-11-10 ENCOUNTER — TELEMEDICINE (OUTPATIENT)
Dept: NEPHROLOGY | Facility: MEDICAL CENTER | Age: 88
End: 2023-11-10
Payer: COMMERCIAL

## 2023-11-10 VITALS
BODY MASS INDEX: 24.74 KG/M2 | DIASTOLIC BLOOD PRESSURE: 77 MMHG | WEIGHT: 126 LBS | SYSTOLIC BLOOD PRESSURE: 130 MMHG | HEIGHT: 60 IN

## 2023-11-10 DIAGNOSIS — R82.71 ASYMPTOMATIC BACTERIURIA: ICD-10-CM

## 2023-11-10 DIAGNOSIS — E11.21 TYPE 2 DIABETES MELLITUS WITH DIABETIC NEPHROPATHY, WITHOUT LONG-TERM CURRENT USE OF INSULIN (HCC): Chronic | ICD-10-CM

## 2023-11-10 DIAGNOSIS — N25.81 HYPERPARATHYROIDISM DUE TO RENAL INSUFFICIENCY (HCC): ICD-10-CM

## 2023-11-10 DIAGNOSIS — N18.4 CKD (CHRONIC KIDNEY DISEASE) STAGE 4, GFR 15-29 ML/MIN (HCC): ICD-10-CM

## 2023-11-10 DIAGNOSIS — D63.8 ANEMIA OF CHRONIC DISEASE: ICD-10-CM

## 2023-11-10 DIAGNOSIS — I10 ESSENTIAL HYPERTENSION: Chronic | ICD-10-CM

## 2023-11-10 PROCEDURE — 99214 OFFICE O/P EST MOD 30 MIN: CPT | Performed by: INTERNAL MEDICINE

## 2023-11-10 ASSESSMENT — ENCOUNTER SYMPTOMS
SHORTNESS OF BREATH: 0
FEVER: 0
ABDOMINAL PAIN: 0

## 2023-11-10 ASSESSMENT — FIBROSIS 4 INDEX: FIB4 SCORE: 1.71

## 2023-11-10 NOTE — PROGRESS NOTES
"Chief Complaint:   No chief complaint on file.  Follow up CKD    This evaluation was conducted via Zoom using secure and encrypted videoconferencing technology. The patient was in their home in the [facility].    The patient's identity was confirmed and verbal consent was obtained for this virtual visit.         History of Present Illness:    Ivette Castelan is a 87 y.o. female with DM2, HTN, CKD4-5 who presents today for follow-up.    She moved from [place] in June, 2022 to [place], [place] to be closer to family.     She says, \"I feel ok.\"  Had some mild cold symptoms.     Re: DM2, diagnosed 1975 when she was overweight. Patient has had microalbuminuria since at least 2010, and macroalbuminuria since 2016. Patient has seen an eye doctor and does not have retinopathy. She has had periods of good control of DM and some periods of poor control. She is still tolerating farxiga well. Says her sugars are good, 30-day average of 128. Home A1c was 7.1%    Re: HTN, diagnosed around 1975. BP control over the years has been fairly controlled. She checks BP at home, usually 130's / 70's. Denies LE edema. Denies Light-headedness .     Re: CKD, she has been seeing a nephrologist in [place], [place] since 2022. She had 3 pregnancies without pre-eclampsia or DM. She denies history of RYAN, kidney stone. She can empty her bladder but is incontinent at times. Denies NSAIDs. Her appetite is \"very good.\"  Weight fluctuates around 126 lbs. Denies daily headache, N/V, metallic taste.            ROS:    Review of Systems   Constitutional:  Negative for fever.   Respiratory:  Negative for shortness of breath.    Cardiovascular:  Negative for chest pain.   Gastrointestinal:  Negative for abdominal pain.   Genitourinary:  Negative for dysuria.   All other systems reviewed and are negative.                  Past Medical History:  Past Medical History:   Diagnosis Date    Arthritis     Blood transfusion without reported diagnosis     Cataract     " Diabetes (HCC)     Kidney disease      Past Surgical History:   Procedure Laterality Date    BLADDER SLING FEMALE      CATARACT EXTRACTION WITH IOL          Current Outpatient Medications   Medication Sig Dispense Refill    metoprolol SR (TOPROL XL) 50 MG TABLET SR 24 HR Take 1 Tablet by mouth every day. 90 Tablet 3    atorvastatin (LIPITOR) 80 MG tablet Take 1 Tablet by mouth every day. 90 Tablet 3    amLODIPine (NORVASC) 10 MG Tab Take 1 Tablet by mouth every day. 90 Tablet 3    losartan (COZAAR) 100 MG Tab Take 1 Tablet by mouth every day. 90 Tablet 3    dapagliflozin propanediol (FARXIGA) 5 MG Tab Take 1 Tablet by mouth every day. 90 Tablet 3    BIOTIN 5000 PO Take  by mouth.      Cholecalciferol (D3-1000) 1000 UNIT Cap Take  by mouth.      Cyanocobalamin (B-12) 1000 MCG Cap Take  by mouth.      Omega-3 Fatty Acids (OMEGA 3 PO) Take  by mouth.      Multiple Vitamins-Minerals (WOMENS MULTIVITAMIN PO) Take  by mouth.       No current facility-administered medications for this visit.               Allergies:   No Known Allergies     Current Outpatient Medications:   (Not in a hospital admission)            Physical Exam:   Vitals obtained by patient:  Vitals:    11/10/23 1543   BP: 130/77   BP Location: Left arm   Patient Position: Sitting   BP Cuff Size: Adult   Weight: 57.2 kg (126 lb)   Height: 1.524 m (5')     Heart rate: 71  Resp rate: 12       Physical Exam:  Constitutional: Alert, no distress, well-groomed.  Skin: No rashes in visible areas.  Eye: Round. Conjunctiva clear, lids normal. No icterus.   ENMT: Lips pink without lesions, good dentition, moist mucous membranes. Phonation normal.  Neck: No masses, no thyromegaly. Moves freely without pain.  Respiratory: Unlabored respiratory effort, no cough or audible wheeze  Psych: Alert and oriented x3, normal affect and mood.            Imaging reviewed & Visualized by me:  DIAGNOSTIC IMAGING REVIEWED AND/OR INTERPRETED BY ME:        Laboratory:   Recent Labs      06/08/23  1111 09/01/23  1410 10/19/23  1304   ALBUMIN 4.2 4.5 4.2   SODIUM 140 140 141   POTASSIUM 5.0 4.5 4.7   CHLORIDE 106 103 106   CO2 21 24 24   BUN 47* 51* 45*   CREATININE 3.00* 3.06* 3.14*   PHOSPHORUS 3.7 5.2* 4.1     Lab Results   Component Value Date/Time    WBC 6.3 10/19/2023 01:04 PM    RBC 3.49 (L) 10/19/2023 01:04 PM    HEMOGLOBIN 10.4 (L) 10/19/2023 01:04 PM    HEMATOCRIT 32.6 (L) 10/19/2023 01:04 PM    MCV 93.4 10/19/2023 01:04 PM    MCH 29.8 10/19/2023 01:04 PM    MCHC 31.9 (L) 10/19/2023 01:04 PM    MPV 9.1 10/19/2023 01:04 PM        URINALYSIS:  Lab Results   Component Value Date/Time    COLORURINE Yellow 10/19/2023 1305    CLARITY Clear 10/19/2023 1305    SPECGRAVITY 1.021 10/19/2023 1305    PHURINE 6.5 10/19/2023 1305    KETONES Negative 10/19/2023 1305    PROTEINURIN 300 (A) 10/19/2023 1305    BILIRUBINUR Negative 10/19/2023 1305    UROBILU 0.2 10/19/2023 1305    NITRITE Negative 10/19/2023 1305    LEUKESTERAS Negative 10/19/2023 1305    OCCULTBLOOD Negative 10/19/2023 1305       UPC  Lab Results   Component Value Date/Time    TOTPROTUR 218.0 (H) 10/19/2023 1305      Lab Results   Component Value Date/Time    CREATININEU 61.18 10/19/2023 1305                  Assessment and Plan:   Ivette Castelan is a 87 y.o. female with DM2, HTN, CKD4 who presents today for follow-up.    1. 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   Her underlying CKD likely from diabetic nephropathy, hypertension, age-related kidney decline.  I recommend avoiding NSAIDs and other nephrotoxins.  I recommend low-sodium diet. I explained the importance of glycemic and blood pressure control to help slow CKD progression.   I recommend maintaining losartan and Farxiga for long-term kidney protection.  She remains at high risk of CKD progression to ESRD with her macroalbuminuria.  She continues to think she would try hemodialysis, and I would recommend catheter-based a trial of  hemodialysis for 2 to 4 weeks if or when that time comes.  Fortunately there remains no acute need for dialysis at this time.   Patient was cautioned of the uremic symptoms she might experience when kidney failure progresses to the point of needing dialysis.    2. Type 2 diabetes mellitus with diabetic nephropathy, without long-term current use of insulin (HCC)  -Controlled per patient.  Continue Farxiga and losartan.  Avoid metformin with GFR less than 30.    3. Essential hypertension  -Mildly elevated at home per patient.  Continue amlodipine, losartan, metoprolol.  If hypertension worsens, I would recommend starting Lasix 80 mg p.o. daily.    4. Anemia, unspecified type  -Persistent.  Due to anemia of chronic disease.  Patient is iron replete.  No acute need for ALYCE injections with hemoglobin over 10.        Return to clinic 2 months with pre-clinic      Franko Green MD  Nephrology  [facility]

## 2023-11-30 SDOH — ECONOMIC STABILITY: HOUSING INSECURITY: IN THE LAST 12 MONTHS, HOW MANY PLACES HAVE YOU LIVED?: 1

## 2023-11-30 SDOH — ECONOMIC STABILITY: HOUSING INSECURITY
IN THE LAST 12 MONTHS, WAS THERE A TIME WHEN YOU DID NOT HAVE A STEADY PLACE TO SLEEP OR SLEPT IN A SHELTER (INCLUDING NOW)?: NO

## 2023-11-30 SDOH — ECONOMIC STABILITY: INCOME INSECURITY: IN THE LAST 12 MONTHS, WAS THERE A TIME WHEN YOU WERE NOT ABLE TO PAY THE MORTGAGE OR RENT ON TIME?: NO

## 2023-11-30 SDOH — ECONOMIC STABILITY: FOOD INSECURITY: WITHIN THE PAST 12 MONTHS, YOU WORRIED THAT YOUR FOOD WOULD RUN OUT BEFORE YOU GOT MONEY TO BUY MORE.: NEVER TRUE

## 2023-11-30 SDOH — HEALTH STABILITY: PHYSICAL HEALTH: ON AVERAGE, HOW MANY DAYS PER WEEK DO YOU ENGAGE IN MODERATE TO STRENUOUS EXERCISE (LIKE A BRISK WALK)?: 1 DAY

## 2023-11-30 SDOH — ECONOMIC STABILITY: FOOD INSECURITY: WITHIN THE PAST 12 MONTHS, THE FOOD YOU BOUGHT JUST DIDN'T LAST AND YOU DIDN'T HAVE MONEY TO GET MORE.: NEVER TRUE

## 2023-11-30 SDOH — ECONOMIC STABILITY: TRANSPORTATION INSECURITY
IN THE PAST 12 MONTHS, HAS LACK OF RELIABLE TRANSPORTATION KEPT YOU FROM MEDICAL APPOINTMENTS, MEETINGS, WORK OR FROM GETTING THINGS NEEDED FOR DAILY LIVING?: NO

## 2023-11-30 SDOH — ECONOMIC STABILITY: TRANSPORTATION INSECURITY
IN THE PAST 12 MONTHS, HAS LACK OF TRANSPORTATION KEPT YOU FROM MEETINGS, WORK, OR FROM GETTING THINGS NEEDED FOR DAILY LIVING?: NO

## 2023-11-30 SDOH — HEALTH STABILITY: PHYSICAL HEALTH: ON AVERAGE, HOW MANY MINUTES DO YOU ENGAGE IN EXERCISE AT THIS LEVEL?: 120 MIN

## 2023-11-30 SDOH — HEALTH STABILITY: MENTAL HEALTH
STRESS IS WHEN SOMEONE FEELS TENSE, NERVOUS, ANXIOUS, OR CAN'T SLEEP AT NIGHT BECAUSE THEIR MIND IS TROUBLED. HOW STRESSED ARE YOU?: NOT AT ALL

## 2023-11-30 SDOH — ECONOMIC STABILITY: INCOME INSECURITY: HOW HARD IS IT FOR YOU TO PAY FOR THE VERY BASICS LIKE FOOD, HOUSING, MEDICAL CARE, AND HEATING?: NOT HARD AT ALL

## 2023-11-30 SDOH — ECONOMIC STABILITY: TRANSPORTATION INSECURITY
IN THE PAST 12 MONTHS, HAS THE LACK OF TRANSPORTATION KEPT YOU FROM MEDICAL APPOINTMENTS OR FROM GETTING MEDICATIONS?: NO

## 2023-11-30 ASSESSMENT — SOCIAL DETERMINANTS OF HEALTH (SDOH)
HOW OFTEN DO YOU HAVE A DRINK CONTAINING ALCOHOL: NEVER
HOW OFTEN DO YOU ATTENT MEETINGS OF THE CLUB OR ORGANIZATION YOU BELONG TO?: 1 TO 4 TIMES PER YEAR
DO YOU BELONG TO ANY CLUBS OR ORGANIZATIONS SUCH AS CHURCH GROUPS UNIONS, FRATERNAL OR ATHLETIC GROUPS, OR SCHOOL GROUPS?: YES
HOW HARD IS IT FOR YOU TO PAY FOR THE VERY BASICS LIKE FOOD, HOUSING, MEDICAL CARE, AND HEATING?: NOT HARD AT ALL
IN A TYPICAL WEEK, HOW MANY TIMES DO YOU TALK ON THE PHONE WITH FAMILY, FRIENDS, OR NEIGHBORS?: MORE THAN THREE TIMES A WEEK
HOW OFTEN DO YOU GET TOGETHER WITH FRIENDS OR RELATIVES?: ONCE A WEEK
HOW OFTEN DO YOU HAVE SIX OR MORE DRINKS ON ONE OCCASION: NEVER
DO YOU BELONG TO ANY CLUBS OR ORGANIZATIONS SUCH AS CHURCH GROUPS UNIONS, FRATERNAL OR ATHLETIC GROUPS, OR SCHOOL GROUPS?: YES
HOW MANY DRINKS CONTAINING ALCOHOL DO YOU HAVE ON A TYPICAL DAY WHEN YOU ARE DRINKING: PATIENT DOES NOT DRINK
HOW OFTEN DO YOU ATTEND CHURCH OR RELIGIOUS SERVICES?: NEVER
IN A TYPICAL WEEK, HOW MANY TIMES DO YOU TALK ON THE PHONE WITH FAMILY, FRIENDS, OR NEIGHBORS?: MORE THAN THREE TIMES A WEEK
HOW OFTEN DO YOU ATTENT MEETINGS OF THE CLUB OR ORGANIZATION YOU BELONG TO?: 1 TO 4 TIMES PER YEAR
HOW OFTEN DO YOU GET TOGETHER WITH FRIENDS OR RELATIVES?: ONCE A WEEK
HOW OFTEN DO YOU ATTEND CHURCH OR RELIGIOUS SERVICES?: NEVER
WITHIN THE PAST 12 MONTHS, YOU WORRIED THAT YOUR FOOD WOULD RUN OUT BEFORE YOU GOT THE MONEY TO BUY MORE: NEVER TRUE

## 2023-11-30 ASSESSMENT — LIFESTYLE VARIABLES
HOW OFTEN DO YOU HAVE SIX OR MORE DRINKS ON ONE OCCASION: NEVER
HOW OFTEN DO YOU HAVE A DRINK CONTAINING ALCOHOL: NEVER
AUDIT-C TOTAL SCORE: 0
HOW MANY STANDARD DRINKS CONTAINING ALCOHOL DO YOU HAVE ON A TYPICAL DAY: PATIENT DOES NOT DRINK
SKIP TO QUESTIONS 9-10: 1

## 2023-12-05 ENCOUNTER — APPOINTMENT (OUTPATIENT)
Dept: MEDICAL GROUP | Facility: CLINIC | Age: 88
End: 2023-12-05
Payer: COMMERCIAL

## 2023-12-12 ENCOUNTER — HOSPITAL ENCOUNTER (OUTPATIENT)
Dept: LAB | Facility: MEDICAL CENTER | Age: 88
End: 2023-12-12
Attending: INTERNAL MEDICINE
Payer: COMMERCIAL

## 2023-12-12 DIAGNOSIS — E11.21 TYPE 2 DIABETES MELLITUS WITH DIABETIC NEPHROPATHY, WITHOUT LONG-TERM CURRENT USE OF INSULIN (HCC): Chronic | ICD-10-CM

## 2023-12-12 DIAGNOSIS — N18.4 CKD (CHRONIC KIDNEY DISEASE) STAGE 4, GFR 15-29 ML/MIN (HCC): ICD-10-CM

## 2023-12-12 DIAGNOSIS — N25.81 HYPERPARATHYROIDISM DUE TO RENAL INSUFFICIENCY (HCC): ICD-10-CM

## 2023-12-12 DIAGNOSIS — D63.8 ANEMIA OF CHRONIC DISEASE: ICD-10-CM

## 2023-12-12 DIAGNOSIS — R82.71 ASYMPTOMATIC BACTERIURIA: ICD-10-CM

## 2023-12-12 LAB
25(OH)D3 SERPL-MCNC: 40 NG/ML (ref 30–100)
ALBUMIN SERPL BCP-MCNC: 4.1 G/DL (ref 3.2–4.9)
ANION GAP SERPL CALC-SCNC: 10 MMOL/L (ref 7–16)
APPEARANCE UR: CLEAR
BACTERIA #/AREA URNS HPF: ABNORMAL /HPF
BILIRUB UR QL STRIP.AUTO: NEGATIVE
BUN SERPL-MCNC: 34 MG/DL (ref 8–22)
CALCIUM SERPL-MCNC: 8.8 MG/DL (ref 8.5–10.5)
CHLORIDE SERPL-SCNC: 107 MMOL/L (ref 96–112)
CO2 SERPL-SCNC: 24 MMOL/L (ref 20–33)
COLOR UR: YELLOW
CREAT SERPL-MCNC: 2.93 MG/DL (ref 0.5–1.4)
CREAT UR-MCNC: 45.95 MG/DL
CREAT UR-MCNC: 46.9 MG/DL
EPI CELLS #/AREA URNS HPF: NEGATIVE /HPF
ERYTHROCYTE [DISTWIDTH] IN BLOOD BY AUTOMATED COUNT: 44.3 FL (ref 35.9–50)
FERRITIN SERPL-MCNC: 213 NG/ML (ref 10–291)
GFR SERPLBLD CREATININE-BSD FMLA CKD-EPI: 15 ML/MIN/1.73 M 2
GLUCOSE SERPL-MCNC: 189 MG/DL (ref 65–99)
GLUCOSE UR STRIP.AUTO-MCNC: >=1000 MG/DL
HCT VFR BLD AUTO: 32.9 % (ref 37–47)
HGB BLD-MCNC: 10.6 G/DL (ref 12–16)
HYALINE CASTS #/AREA URNS LPF: ABNORMAL /LPF
IRON SATN MFR SERPL: 30 % (ref 15–55)
IRON SERPL-MCNC: 70 UG/DL (ref 40–170)
KETONES UR STRIP.AUTO-MCNC: NEGATIVE MG/DL
LEUKOCYTE ESTERASE UR QL STRIP.AUTO: NEGATIVE
MCH RBC QN AUTO: 30.4 PG (ref 27–33)
MCHC RBC AUTO-ENTMCNC: 32.2 G/DL (ref 32.2–35.5)
MCV RBC AUTO: 94.3 FL (ref 81.4–97.8)
MICRO URNS: ABNORMAL
MICROALBUMIN UR-MCNC: 172.3 MG/DL
MICROALBUMIN/CREAT UR: 3750 MG/G (ref 0–30)
NITRITE UR QL STRIP.AUTO: NEGATIVE
PH UR STRIP.AUTO: 7 [PH] (ref 5–8)
PHOSPHATE SERPL-MCNC: 3.9 MG/DL (ref 2.5–4.5)
PLATELET # BLD AUTO: 284 K/UL (ref 164–446)
PMV BLD AUTO: 9.3 FL (ref 9–12.9)
POTASSIUM SERPL-SCNC: 5.4 MMOL/L (ref 3.6–5.5)
PROT UR QL STRIP: 300 MG/DL
PROT UR-MCNC: 246 MG/DL (ref 0–15)
PROT/CREAT UR: 5245 MG/G (ref 10–107)
PTH-INTACT SERPL-MCNC: 203 PG/ML (ref 14–72)
RBC # BLD AUTO: 3.49 M/UL (ref 4.2–5.4)
RBC # URNS HPF: ABNORMAL /HPF
RBC UR QL AUTO: ABNORMAL
SODIUM SERPL-SCNC: 141 MMOL/L (ref 135–145)
SP GR UR STRIP.AUTO: 1.02
TIBC SERPL-MCNC: 230 UG/DL (ref 250–450)
UIBC SERPL-MCNC: 160 UG/DL (ref 110–370)
UROBILINOGEN UR STRIP.AUTO-MCNC: 0.2 MG/DL
WBC # BLD AUTO: 6.8 K/UL (ref 4.8–10.8)
WBC #/AREA URNS HPF: ABNORMAL /HPF

## 2023-12-12 PROCEDURE — 80048 BASIC METABOLIC PNL TOTAL CA: CPT

## 2023-12-12 PROCEDURE — 84156 ASSAY OF PROTEIN URINE: CPT

## 2023-12-12 PROCEDURE — 84100 ASSAY OF PHOSPHORUS: CPT

## 2023-12-12 PROCEDURE — 83970 ASSAY OF PARATHORMONE: CPT

## 2023-12-12 PROCEDURE — 81001 URINALYSIS AUTO W/SCOPE: CPT

## 2023-12-12 PROCEDURE — 87086 URINE CULTURE/COLONY COUNT: CPT

## 2023-12-12 PROCEDURE — 83540 ASSAY OF IRON: CPT

## 2023-12-12 PROCEDURE — 82043 UR ALBUMIN QUANTITATIVE: CPT

## 2023-12-12 PROCEDURE — 82040 ASSAY OF SERUM ALBUMIN: CPT

## 2023-12-12 PROCEDURE — 82570 ASSAY OF URINE CREATININE: CPT

## 2023-12-12 PROCEDURE — 82306 VITAMIN D 25 HYDROXY: CPT

## 2023-12-12 PROCEDURE — 85027 COMPLETE CBC AUTOMATED: CPT

## 2023-12-12 PROCEDURE — 83550 IRON BINDING TEST: CPT

## 2023-12-12 PROCEDURE — 87186 SC STD MICRODIL/AGAR DIL: CPT

## 2023-12-12 PROCEDURE — 36415 COLL VENOUS BLD VENIPUNCTURE: CPT

## 2023-12-12 PROCEDURE — 82728 ASSAY OF FERRITIN: CPT

## 2023-12-12 PROCEDURE — 87077 CULTURE AEROBIC IDENTIFY: CPT

## 2024-01-12 ENCOUNTER — TELEMEDICINE (OUTPATIENT)
Dept: NEPHROLOGY | Facility: MEDICAL CENTER | Age: 89
End: 2024-01-12
Payer: COMMERCIAL

## 2024-01-12 VITALS
TEMPERATURE: 97 F | HEIGHT: 60 IN | WEIGHT: 126 LBS | DIASTOLIC BLOOD PRESSURE: 70 MMHG | BODY MASS INDEX: 24.74 KG/M2 | HEART RATE: 76 BPM | SYSTOLIC BLOOD PRESSURE: 133 MMHG

## 2024-01-12 DIAGNOSIS — Z87.440 HISTORY OF UTI: ICD-10-CM

## 2024-01-12 DIAGNOSIS — I10 ESSENTIAL HYPERTENSION: Chronic | ICD-10-CM

## 2024-01-12 DIAGNOSIS — E11.21 TYPE 2 DIABETES MELLITUS WITH DIABETIC NEPHROPATHY, WITHOUT LONG-TERM CURRENT USE OF INSULIN (HCC): Chronic | ICD-10-CM

## 2024-01-12 DIAGNOSIS — N18.4 CKD (CHRONIC KIDNEY DISEASE) STAGE 4, GFR 15-29 ML/MIN (HCC): ICD-10-CM

## 2024-01-12 DIAGNOSIS — N25.81 HYPERPARATHYROIDISM DUE TO RENAL INSUFFICIENCY (HCC): ICD-10-CM

## 2024-01-12 DIAGNOSIS — D63.8 ANEMIA OF CHRONIC DISEASE: ICD-10-CM

## 2024-01-12 PROCEDURE — 99214 OFFICE O/P EST MOD 30 MIN: CPT | Mod: 95 | Performed by: INTERNAL MEDICINE

## 2024-01-12 ASSESSMENT — ENCOUNTER SYMPTOMS
SHORTNESS OF BREATH: 0
ABDOMINAL PAIN: 0
FEVER: 0

## 2024-01-12 ASSESSMENT — FIBROSIS 4 INDEX: FIB4 SCORE: 1.56

## 2024-01-12 NOTE — PROGRESS NOTES
"Chief Complaint:   Chief Complaint   Patient presents with    Chronic Kidney Disease   Follow up CKD    This evaluation was conducted via Zoom using secure and encrypted videoconferencing technology. The patient was in their home in the [facility].    The patient's identity was confirmed and verbal consent was obtained for this virtual visit.           History of Present Illness:    Ivette Castelan is a 88 y.o. female with DM2, HTN, CKD4-5 who presents today for follow-up.    She moved from [place] in June, 2022 to [place], [place] to be closer to family.     She says, \"I feel great.\"      Re: DM2, diagnosed 1975 when she was overweight. Patient has had microalbuminuria since at least 2010, and macroalbuminuria since 2016. Patient has seen an eye doctor and does not have retinopathy. She has had periods of good control of DM and some periods of poor control. She is still tolerating farxiga well. Says her sugars are \"pretty good,\" back on track after manish.  Says average sugar 130-140.     Re: HTN, diagnosed around 1975. BP control over the years has been fairly controlled. She checks BP at home, usually 130's / 70's. Denies LE edema. Denies Light-headedness .     Re: CKD, she has been seeing a nephrologist in [place], [place] since 2022. She had 3 pregnancies without pre-eclampsia or DM. She denies history of RYAN, kidney stone. She can empty her bladder but is incontinent at times. Denies NSAIDs. Her appetite is \"I have no problem with that.\"  Weight fluctuates around 126 lbs. Sleeps well. Denies daily headache, N/V, metallic taste.            ROS:    Review of Systems   Constitutional:  Negative for fever.   Respiratory:  Negative for shortness of breath.    Cardiovascular:  Negative for chest pain.   Gastrointestinal:  Negative for abdominal pain.   Genitourinary:  Negative for dysuria and hematuria.   All other systems reviewed and are negative.                  Past Medical History:  Past Medical History:   Diagnosis " Date    Arthritis     Blood transfusion without reported diagnosis     Cataract     Diabetes (HCC)     Kidney disease      Past Surgical History:   Procedure Laterality Date    BLADDER SLING FEMALE      CATARACT EXTRACTION WITH IOL          Current Outpatient Medications   Medication Sig Dispense Refill    metoprolol SR (TOPROL XL) 50 MG TABLET SR 24 HR Take 1 Tablet by mouth every day. 90 Tablet 3    atorvastatin (LIPITOR) 80 MG tablet Take 1 Tablet by mouth every day. 90 Tablet 3    amLODIPine (NORVASC) 10 MG Tab Take 1 Tablet by mouth every day. 90 Tablet 3    losartan (COZAAR) 100 MG Tab Take 1 Tablet by mouth every day. 90 Tablet 3    dapagliflozin propanediol (FARXIGA) 5 MG Tab Take 1 Tablet by mouth every day. 90 Tablet 3    BIOTIN 5000 PO Take  by mouth.      Cholecalciferol (D3-1000) 1000 UNIT Cap Take  by mouth.      Cyanocobalamin (B-12) 1000 MCG Cap Take  by mouth.      Omega-3 Fatty Acids (OMEGA 3 PO) Take  by mouth.      Multiple Vitamins-Minerals (WOMENS MULTIVITAMIN PO) Take  by mouth.       No current facility-administered medications for this visit.               Allergies:   No Known Allergies     Current Outpatient Medications:   (Not in a hospital admission)            Physical Exam:   Vitals obtained by patient:  Vitals:    01/12/24 1423   BP: 133/70   BP Location: Left arm   Patient Position: Sitting   BP Cuff Size: Adult   Pulse: 76   Temp: 36.1 °C (97 °F)   Weight: 57.2 kg (126 lb)   Height: 1.524 m (5')         Physical Exam:  Constitutional: Alert, no distress, well-groomed.  Skin: No rashes in visible areas.  Eye: Round. Conjunctiva clear, lids normal. No icterus.   ENMT: Lips pink without lesions, good dentition, moist mucous membranes. Phonation normal.  Neck: No masses, no thyromegaly. Moves freely without pain.  Respiratory: Unlabored respiratory effort, no cough or audible wheeze  Psych: Alert and oriented x3, normal affect and mood.   Neuro: no asterixis noted on self hand           Imaging reviewed & Visualized by me:  DIAGNOSTIC IMAGING REVIEWED AND/OR INTERPRETED BY ME:        Laboratory:   Recent Labs     09/01/23  1410 10/19/23  1304 12/12/23  1306   ALBUMIN 4.5 4.2 4.1   SODIUM 140 141 141   POTASSIUM 4.5 4.7 5.4   CHLORIDE 103 106 107   CO2 24 24 24   BUN 51* 45* 34*   CREATININE 3.06* 3.14* 2.93*   PHOSPHORUS 5.2* 4.1 3.9     Lab Results   Component Value Date/Time    WBC 6.8 12/12/2023 01:06 PM    RBC 3.49 (L) 12/12/2023 01:06 PM    HEMOGLOBIN 10.6 (L) 12/12/2023 01:06 PM    HEMATOCRIT 32.9 (L) 12/12/2023 01:06 PM    MCV 94.3 12/12/2023 01:06 PM    MCH 30.4 12/12/2023 01:06 PM    MCHC 32.2 12/12/2023 01:06 PM    MPV 9.3 12/12/2023 01:06 PM        URINALYSIS:  Lab Results   Component Value Date/Time    COLORURINE Yellow 12/12/2023 1306    CLARITY Clear 12/12/2023 1306    SPECGRAVITY 1.017 12/12/2023 1306    PHURINE 7.0 12/12/2023 1306    KETONES Negative 12/12/2023 1306    PROTEINURIN 300 (A) 12/12/2023 1306    BILIRUBINUR Negative 12/12/2023 1306    UROBILU 0.2 12/12/2023 1306    NITRITE Negative 12/12/2023 1306    LEUKESTERAS Negative 12/12/2023 1306    OCCULTBLOOD Trace (A) 12/12/2023 1306       UPC  Lab Results   Component Value Date/Time    TOTPROTUR 246.0 (H) 12/12/2023 1306      Lab Results   Component Value Date/Time    CREATININEU 46.90 12/12/2023 1306                  Assessment and Plan:   Ivette Castelan is a 88 y.o. female with DM2, HTN, CKD4-5 who presents today for follow-up.    1. 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   Her underlying CKD likely from diabetic nephropathy, hypertension, age-related kidney decline.  I recommend avoiding NSAIDs and other nephrotoxins.  I recommend a low-sodium diet.  I explained the importance of glycemic and blood pressure control to help slow CKD progression.   I recommend maintaining losartan and Farxiga for long-term kidney protection.  She remains at high risk of CKD  progression to ESRD with her macroalbuminuria.  She continues to think she would try hemodialysis, and I would recommend catheter-based a trial of hemodialysis for 2 to 4 weeks if or when that time comes.  Fortunately, there remains no acute need for dialysis at this time.  Patient was cautioned of the uremic symptoms she would experience if or when her kidney failure progresses to the point of needing dialysis.    KFRE 2-Year: 48.3% at 12/12/2023  1:06 PM  Calculated from:  Serum Creatinine: 2.93 mg/dL at 12/12/2023  1:06 PM  Urine Albumin Creatinine Ratio: 3,750 mg/g at 12/12/2023  1:06 PM  Age: 87 years  Sex: Female at 12/12/2023  1:06 PM  Has CKD-3 to CKD-5: Yes     KFRE 5-Year: 87.3% at 12/12/2023  1:06 PM  Calculated from:  Serum Creatinine: 2.93 mg/dL at 12/12/2023  1:06 PM  Urine Albumin Creatinine Ratio: 3,750 mg/g at 12/12/2023  1:06 PM  Age: 87 years  Sex: Female at 12/12/2023  1:06 PM  Has CKD-3 to CKD-5: Yes      2. Type 2 diabetes mellitus with diabetic nephropathy, without long-term current use of insulin (HCC)  -Controlled per patient.  Continue Farxiga and losartan.  Avoid metformin with GFR less than 30.    3. Essential hypertension  -Remains mildly elevated at home.  Continue amlodipine, losartan, metoprolol.  If hypertension worsens, I would recommend adding Lasix 80 mg p.o. daily.    4. Anemia, unspecified type  -Persistent, due to anemia of chronic disease.  Patient is iron replete.  There remains no acute need for ALYCE injections with hemoglobin over 10.  Continue to monitor CBC and iron studies.    5.  Asymptomatic bacteriuria  - There is no need to treat if the patient has no symptoms of UTI.        Return to clinic 2 months with pre-clinic      Franko Green MD  Nephrology  [facility]

## 2024-02-16 ENCOUNTER — PATIENT MESSAGE (OUTPATIENT)
Dept: HEALTH INFORMATION MANAGEMENT | Facility: OTHER | Age: 89
End: 2024-02-16

## 2024-03-12 ENCOUNTER — HOSPITAL ENCOUNTER (OUTPATIENT)
Dept: LAB | Facility: MEDICAL CENTER | Age: 89
End: 2024-03-12
Attending: INTERNAL MEDICINE
Payer: COMMERCIAL

## 2024-03-12 DIAGNOSIS — N25.81 HYPERPARATHYROIDISM DUE TO RENAL INSUFFICIENCY (HCC): ICD-10-CM

## 2024-03-12 DIAGNOSIS — N18.4 CKD (CHRONIC KIDNEY DISEASE) STAGE 4, GFR 15-29 ML/MIN (HCC): ICD-10-CM

## 2024-03-12 DIAGNOSIS — Z87.440 HISTORY OF UTI: ICD-10-CM

## 2024-03-12 DIAGNOSIS — D63.8 ANEMIA OF CHRONIC DISEASE: ICD-10-CM

## 2024-03-12 LAB
ALBUMIN SERPL BCP-MCNC: 4 G/DL (ref 3.2–4.9)
ANION GAP SERPL CALC-SCNC: 13 MMOL/L (ref 7–16)
APPEARANCE UR: CLEAR
BACTERIA #/AREA URNS HPF: NEGATIVE /HPF
BILIRUB UR QL STRIP.AUTO: NEGATIVE
BUN SERPL-MCNC: 40 MG/DL (ref 8–22)
CALCIUM SERPL-MCNC: 9.1 MG/DL (ref 8.5–10.5)
CHLORIDE SERPL-SCNC: 107 MMOL/L (ref 96–112)
CO2 SERPL-SCNC: 21 MMOL/L (ref 20–33)
COLOR UR: YELLOW
CREAT SERPL-MCNC: 3.24 MG/DL (ref 0.5–1.4)
CREAT UR-MCNC: 67.44 MG/DL
EPI CELLS #/AREA URNS HPF: NEGATIVE /HPF
ERYTHROCYTE [DISTWIDTH] IN BLOOD BY AUTOMATED COUNT: 41.1 FL (ref 35.9–50)
FERRITIN SERPL-MCNC: 231 NG/ML (ref 10–291)
GFR SERPLBLD CREATININE-BSD FMLA CKD-EPI: 13 ML/MIN/1.73 M 2
GLUCOSE SERPL-MCNC: 110 MG/DL (ref 65–99)
GLUCOSE UR STRIP.AUTO-MCNC: 500 MG/DL
HCT VFR BLD AUTO: 31.1 % (ref 37–47)
HGB BLD-MCNC: 10.5 G/DL (ref 12–16)
HYALINE CASTS #/AREA URNS LPF: NORMAL /LPF
IRON SATN MFR SERPL: 29 % (ref 15–55)
IRON SERPL-MCNC: 71 UG/DL (ref 40–170)
KETONES UR STRIP.AUTO-MCNC: NEGATIVE MG/DL
LEUKOCYTE ESTERASE UR QL STRIP.AUTO: NEGATIVE
MCH RBC QN AUTO: 30.6 PG (ref 27–33)
MCHC RBC AUTO-ENTMCNC: 33.8 G/DL (ref 32.2–35.5)
MCV RBC AUTO: 90.7 FL (ref 81.4–97.8)
MICRO URNS: ABNORMAL
NITRITE UR QL STRIP.AUTO: NEGATIVE
PH UR STRIP.AUTO: 6 [PH] (ref 5–8)
PHOSPHATE SERPL-MCNC: 3.7 MG/DL (ref 2.5–4.5)
PLATELET # BLD AUTO: 315 K/UL (ref 164–446)
PMV BLD AUTO: 9.1 FL (ref 9–12.9)
POTASSIUM SERPL-SCNC: 4.9 MMOL/L (ref 3.6–5.5)
PROT UR QL STRIP: 300 MG/DL
PROT UR-MCNC: 309 MG/DL (ref 0–15)
PROT/CREAT UR: 4582 MG/G (ref 10–107)
RBC # BLD AUTO: 3.43 M/UL (ref 4.2–5.4)
RBC # URNS HPF: NORMAL /HPF
RBC UR QL AUTO: NEGATIVE
SODIUM SERPL-SCNC: 141 MMOL/L (ref 135–145)
SP GR UR STRIP.AUTO: 1.02
TIBC SERPL-MCNC: 241 UG/DL (ref 250–450)
UIBC SERPL-MCNC: 170 UG/DL (ref 110–370)
UROBILINOGEN UR STRIP.AUTO-MCNC: 0.2 MG/DL
WBC # BLD AUTO: 7.5 K/UL (ref 4.8–10.8)
WBC #/AREA URNS HPF: NORMAL /HPF

## 2024-03-12 PROCEDURE — 82043 UR ALBUMIN QUANTITATIVE: CPT

## 2024-03-12 PROCEDURE — 84156 ASSAY OF PROTEIN URINE: CPT

## 2024-03-12 PROCEDURE — 82570 ASSAY OF URINE CREATININE: CPT

## 2024-03-12 PROCEDURE — 80048 BASIC METABOLIC PNL TOTAL CA: CPT

## 2024-03-12 PROCEDURE — 85027 COMPLETE CBC AUTOMATED: CPT

## 2024-03-12 PROCEDURE — 84100 ASSAY OF PHOSPHORUS: CPT

## 2024-03-12 PROCEDURE — 83550 IRON BINDING TEST: CPT

## 2024-03-12 PROCEDURE — 36415 COLL VENOUS BLD VENIPUNCTURE: CPT

## 2024-03-12 PROCEDURE — 82040 ASSAY OF SERUM ALBUMIN: CPT

## 2024-03-12 PROCEDURE — 83540 ASSAY OF IRON: CPT

## 2024-03-12 PROCEDURE — 82728 ASSAY OF FERRITIN: CPT

## 2024-03-12 PROCEDURE — 81001 URINALYSIS AUTO W/SCOPE: CPT

## 2024-03-13 LAB
CREAT UR-MCNC: 67.75 MG/DL
MICROALBUMIN UR-MCNC: 199.9 MG/DL
MICROALBUMIN/CREAT UR: 2951 MG/G (ref 0–30)

## 2024-03-22 ENCOUNTER — TELEMEDICINE (OUTPATIENT)
Dept: NEPHROLOGY | Facility: MEDICAL CENTER | Age: 89
End: 2024-03-22
Payer: COMMERCIAL

## 2024-03-22 VITALS
OXYGEN SATURATION: 98 % | BODY MASS INDEX: 24.54 KG/M2 | DIASTOLIC BLOOD PRESSURE: 69 MMHG | WEIGHT: 125 LBS | SYSTOLIC BLOOD PRESSURE: 131 MMHG | HEIGHT: 60 IN | HEART RATE: 96 BPM | TEMPERATURE: 98.2 F

## 2024-03-22 DIAGNOSIS — I10 ESSENTIAL HYPERTENSION: Chronic | ICD-10-CM

## 2024-03-22 DIAGNOSIS — D63.8 ANEMIA OF CHRONIC DISEASE: ICD-10-CM

## 2024-03-22 DIAGNOSIS — N18.4 CKD (CHRONIC KIDNEY DISEASE) STAGE 4, GFR 15-29 ML/MIN (HCC): ICD-10-CM

## 2024-03-22 DIAGNOSIS — N25.81 HYPERPARATHYROIDISM DUE TO RENAL INSUFFICIENCY (HCC): ICD-10-CM

## 2024-03-22 DIAGNOSIS — E11.21 TYPE 2 DIABETES MELLITUS WITH DIABETIC NEPHROPATHY, WITHOUT LONG-TERM CURRENT USE OF INSULIN (HCC): Chronic | ICD-10-CM

## 2024-03-22 PROCEDURE — 99214 OFFICE O/P EST MOD 30 MIN: CPT | Mod: 95 | Performed by: INTERNAL MEDICINE

## 2024-03-22 ASSESSMENT — ENCOUNTER SYMPTOMS
FEVER: 0
SHORTNESS OF BREATH: 0
ABDOMINAL PAIN: 0

## 2024-03-22 ASSESSMENT — FIBROSIS 4 INDEX: FIB4 SCORE: 1.4

## 2024-03-22 NOTE — PROGRESS NOTES
"Chief Complaint:   Chief Complaint   Patient presents with    Chronic Kidney Disease   Follow up CKD    This evaluation was conducted via Zoom using secure and encrypted videoconferencing technology. The patient was in their home in the [facility].    The patient's identity was confirmed and verbal consent was obtained for this virtual visit.           History of Present Illness:    Ivette Castelan is a 88 y.o. female with DM2, HTN, CKD4-5 who presents today for follow-up.    She moved from [place] in June, 2022 to [place], [place] to be closer to family.     She says, \"I feel great.\"      Re: DM2, diagnosed 1975 when she was overweight. Patient has had microalbuminuria since at least 2010, and macroalbuminuria since 2016. Patient has seen an eye doctor and does not have retinopathy. She has had periods of good control of DM and some periods of poor control. She is still tolerating farxiga well, denies yeast infections. Says her sugars are \"some days I've been bad.\"  Says average sugar was 131 in the last month.     Re: HTN, diagnosed around 1975. BP control over the years has been fairly controlled. She checks BP at home, averaging 130's / 60-70's. Denies LE edema. Denies Light-headedness.     Re: CKD, she has been seeing a nephrologist in [place], [place] since 2022. She had 3 pregnancies without pre-eclampsia or DM. She denies history of RYAN, kidney stone. She can empty her bladder but is incontinent at times. Denies NSAIDs. Her appetite is \"no problem.\"  Weight fluctuates around 125 lbs. Sleeps fine. Denies daily headache, N/V, metallic taste.            ROS:    Review of Systems   Constitutional:  Negative for fever.   Respiratory:  Negative for shortness of breath.    Cardiovascular:  Negative for chest pain.   Gastrointestinal:  Negative for abdominal pain.   Genitourinary:  Negative for dysuria.   All other systems reviewed and are negative.                  Past Medical History:  Past Medical History:   Diagnosis " Date    Arthritis     Blood transfusion without reported diagnosis     Cataract     Diabetes (HCC)     Kidney disease      Past Surgical History:   Procedure Laterality Date    BLADDER SLING FEMALE      CATARACT EXTRACTION WITH IOL          Current Outpatient Medications   Medication Sig Dispense Refill    metoprolol SR (TOPROL XL) 50 MG TABLET SR 24 HR Take 1 Tablet by mouth every day. 90 Tablet 3    atorvastatin (LIPITOR) 80 MG tablet Take 1 Tablet by mouth every day. 90 Tablet 3    amLODIPine (NORVASC) 10 MG Tab Take 1 Tablet by mouth every day. 90 Tablet 3    losartan (COZAAR) 100 MG Tab Take 1 Tablet by mouth every day. 90 Tablet 3    dapagliflozin propanediol (FARXIGA) 5 MG Tab Take 1 Tablet by mouth every day. 90 Tablet 3    BIOTIN 5000 PO Take  by mouth.      Cholecalciferol (D3-1000) 1000 UNIT Cap Take  by mouth.      Cyanocobalamin (B-12) 1000 MCG Cap Take  by mouth.      Omega-3 Fatty Acids (OMEGA 3 PO) Take  by mouth.      Multiple Vitamins-Minerals (WOMENS MULTIVITAMIN PO) Take  by mouth.       No current facility-administered medications for this visit.               Allergies:   No Known Allergies     Current Outpatient Medications:   (Not in a hospital admission)            Physical Exam:   Vitals obtained by patient:  Vitals:    03/22/24 1403   BP: 131/69   BP Location: Left arm   Patient Position: Sitting   BP Cuff Size: Adult   Pulse: 96   Temp: 36.8 °C (98.2 °F)   TempSrc: Temporal   SpO2: 98%   Weight: 56.7 kg (125 lb)   Height: 1.524 m (5')       Physical Exam:  Constitutional: Alert, no distress, well-groomed.  Skin: No rashes in visible areas.  Eye: Round. Conjunctiva clear, lids normal. No icterus.   ENMT: Lips pink without lesions, good dentition, moist mucous membranes. Phonation normal.  Neck: No masses, no thyromegaly. Moves freely without pain.  Respiratory: Unlabored respiratory effort, no cough or audible wheeze  Psych: Alert and oriented x3, normal affect and mood.            Imaging  reviewed & Visualized by me:  DIAGNOSTIC IMAGING REVIEWED AND/OR INTERPRETED BY ME:        Laboratory:   Recent Labs     10/19/23  1304 12/12/23  1306 03/12/24  1304   ALBUMIN 4.2 4.1 4.0   SODIUM 141 141 141   POTASSIUM 4.7 5.4 4.9   CHLORIDE 106 107 107   CO2 24 24 21   BUN 45* 34* 40*   CREATININE 3.14* 2.93* 3.24*   PHOSPHORUS 4.1 3.9 3.7     Lab Results   Component Value Date/Time    WBC 7.5 03/12/2024 01:04 PM    RBC 3.43 (L) 03/12/2024 01:04 PM    HEMOGLOBIN 10.5 (L) 03/12/2024 01:04 PM    HEMATOCRIT 31.1 (L) 03/12/2024 01:04 PM    MCV 90.7 03/12/2024 01:04 PM    MCH 30.6 03/12/2024 01:04 PM    MCHC 33.8 03/12/2024 01:04 PM    MPV 9.1 03/12/2024 01:04 PM        URINALYSIS:  Lab Results   Component Value Date/Time    COLORURINE Yellow 03/12/2024 1304    CLARITY Clear 03/12/2024 1304    SPECGRAVITY 1.019 03/12/2024 1304    PHURINE 6.0 03/12/2024 1304    KETONES Negative 03/12/2024 1304    PROTEINURIN 300 (A) 03/12/2024 1304    BILIRUBINUR Negative 03/12/2024 1304    UROBILU 0.2 03/12/2024 1304    NITRITE Negative 03/12/2024 1304    LEUKESTERAS Negative 03/12/2024 1304    OCCULTBLOOD Negative 03/12/2024 1304       UPC  Lab Results   Component Value Date/Time    TOTPROTUR 309.0 (H) 03/12/2024 1304      Lab Results   Component Value Date/Time    CREATININEU 67.44 03/12/2024 1304                Assessment and Plan:   Ivette Castelan is a 88 y.o. female with DM2, HTN, CKD4-5 who presents today for follow-up.    1. 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   Her underlying CKD likely from diabetic nephropathy, hypertension, age-related kidney decline.  I recommend avoiding NSAIDs and other nephrotoxins.  I recommend a low-sodium diet.  I explained the importance of glycemic and blood pressure control to help slow CKD progression.   I recommend maintaining losartan and Farxiga for long-term kidney protection.  She remains at high risk of CKD progression to  ESRD with her macroalbuminuria.  She continues to think she would try hemodialysis, and I would recommend catheter-based a trial of hemodialysis for 2 to 4 weeks if or when that time comes.  Fortunately, there remains no acute need for dialysis at this time.  Patient was cautioned of the uremic symptoms she would experience if or when her kidney failure progresses to the point of needing dialysis.    KFRE 2-Year: 50.8% at 3/12/2024  1:04 PM  Calculated from:  Serum Creatinine: 3.24 mg/dL at 3/12/2024  1:04 PM  Urine Albumin Creatinine Ratio: 2,951 mg/g at 3/12/2024  1:04 PM  Age: 88 years  Sex: Female at 3/12/2024  1:04 PM  Has CKD-3 to CKD-5: Yes     KFRE 5-Year: 89.1% at 3/12/2024  1:04 PM  Calculated from:  Serum Creatinine: 3.24 mg/dL at 3/12/2024  1:04 PM  Urine Albumin Creatinine Ratio: 2,951 mg/g at 3/12/2024  1:04 PM  Age: 88 years  Sex: Female at 3/12/2024  1:04 PM  Has CKD-3 to CKD-5: Yes      2. Type 2 diabetes mellitus with diabetic nephropathy, without long-term current use of insulin (HCC)  -Controlled per patient.  I recommend maintaining Farxiga and losartan.  Avoid metformin with GFR less than 30.    3. Essential hypertension  -Remains mildly elevated at home.  Continue amlodipine, losartan, metoprolol.  If hypertension worsens, I would recommend adding Lasix 80 mg p.o. daily.    4. Anemia, unspecified type  -Persistent.  Iron replete.  Anemia due to anemia of chronic disease.  There remains no acute need for ALYCE injections with hemoglobin over 10.  Continue to monitor CBC and iron studies.          Return to clinic in 2 months with pre-clinic      Franko Green MD  Nephrology  [facility]

## 2024-06-17 DIAGNOSIS — N18.5 STAGE 5 CHRONIC KIDNEY DISEASE NOT ON CHRONIC DIALYSIS (HCC): ICD-10-CM

## 2024-07-12 ENCOUNTER — HOSPITAL ENCOUNTER (OUTPATIENT)
Dept: LAB | Facility: MEDICAL CENTER | Age: 89
End: 2024-07-12
Attending: INTERNAL MEDICINE
Payer: COMMERCIAL

## 2024-07-12 DIAGNOSIS — N18.4 CKD (CHRONIC KIDNEY DISEASE) STAGE 4, GFR 15-29 ML/MIN (HCC): ICD-10-CM

## 2024-07-12 DIAGNOSIS — N25.81 HYPERPARATHYROIDISM DUE TO RENAL INSUFFICIENCY (HCC): ICD-10-CM

## 2024-07-12 DIAGNOSIS — E11.21 TYPE 2 DIABETES MELLITUS WITH DIABETIC NEPHROPATHY, WITHOUT LONG-TERM CURRENT USE OF INSULIN (HCC): Chronic | ICD-10-CM

## 2024-07-12 DIAGNOSIS — D63.8 ANEMIA OF CHRONIC DISEASE: ICD-10-CM

## 2024-07-12 LAB
25(OH)D3 SERPL-MCNC: 48 NG/ML (ref 30–100)
ALBUMIN SERPL BCP-MCNC: 4.1 G/DL (ref 3.2–4.9)
ANION GAP SERPL CALC-SCNC: 15 MMOL/L (ref 7–16)
APPEARANCE UR: CLEAR
BACTERIA #/AREA URNS HPF: NEGATIVE /HPF
BILIRUB UR QL STRIP.AUTO: NEGATIVE
BUN SERPL-MCNC: 51 MG/DL (ref 8–22)
CALCIUM SERPL-MCNC: 9.3 MG/DL (ref 8.5–10.5)
CHLORIDE SERPL-SCNC: 105 MMOL/L (ref 96–112)
CO2 SERPL-SCNC: 20 MMOL/L (ref 20–33)
COLOR UR: YELLOW
CREAT SERPL-MCNC: 3.69 MG/DL (ref 0.5–1.4)
CREAT UR-MCNC: 60.23 MG/DL
EPI CELLS #/AREA URNS HPF: NEGATIVE /HPF
ERYTHROCYTE [DISTWIDTH] IN BLOOD BY AUTOMATED COUNT: 42.8 FL (ref 35.9–50)
FASTING STATUS PATIENT QL REPORTED: NORMAL
FERRITIN SERPL-MCNC: 243 NG/ML (ref 10–291)
GFR SERPLBLD CREATININE-BSD FMLA CKD-EPI: 11 ML/MIN/1.73 M 2
GLUCOSE SERPL-MCNC: 169 MG/DL (ref 65–99)
GLUCOSE UR STRIP.AUTO-MCNC: 500 MG/DL
HCT VFR BLD AUTO: 32.9 % (ref 37–47)
HGB BLD-MCNC: 10.8 G/DL (ref 12–16)
HYALINE CASTS #/AREA URNS LPF: NORMAL /LPF
IRON SATN MFR SERPL: 21 % (ref 15–55)
IRON SERPL-MCNC: 53 UG/DL (ref 40–170)
KETONES UR STRIP.AUTO-MCNC: NEGATIVE MG/DL
LEUKOCYTE ESTERASE UR QL STRIP.AUTO: NEGATIVE
MCH RBC QN AUTO: 30.6 PG (ref 27–33)
MCHC RBC AUTO-ENTMCNC: 32.8 G/DL (ref 32.2–35.5)
MCV RBC AUTO: 93.2 FL (ref 81.4–97.8)
MICRO URNS: ABNORMAL
NITRITE UR QL STRIP.AUTO: NEGATIVE
PH UR STRIP.AUTO: 6.5 [PH] (ref 5–8)
PHOSPHATE SERPL-MCNC: 4.3 MG/DL (ref 2.5–4.5)
PLATELET # BLD AUTO: 262 K/UL (ref 164–446)
PMV BLD AUTO: 9.2 FL (ref 9–12.9)
POTASSIUM SERPL-SCNC: 5.3 MMOL/L (ref 3.6–5.5)
PROT UR QL STRIP: 300 MG/DL
PROT UR-MCNC: 314 MG/DL (ref 0–15)
PROT/CREAT UR: 5213 MG/G (ref 10–107)
PTH-INTACT SERPL-MCNC: 209 PG/ML (ref 14–72)
RBC # BLD AUTO: 3.53 M/UL (ref 4.2–5.4)
RBC # URNS HPF: NORMAL /HPF
RBC UR QL AUTO: NEGATIVE
SODIUM SERPL-SCNC: 140 MMOL/L (ref 135–145)
SP GR UR STRIP.AUTO: 1.02
TIBC SERPL-MCNC: 248 UG/DL (ref 250–450)
UIBC SERPL-MCNC: 195 UG/DL (ref 110–370)
UROBILINOGEN UR STRIP.AUTO-MCNC: 0.2 MG/DL
WBC # BLD AUTO: 6.7 K/UL (ref 4.8–10.8)
WBC #/AREA URNS HPF: NORMAL /HPF

## 2024-07-12 PROCEDURE — 83970 ASSAY OF PARATHORMONE: CPT

## 2024-07-12 PROCEDURE — 82043 UR ALBUMIN QUANTITATIVE: CPT

## 2024-07-12 PROCEDURE — 82570 ASSAY OF URINE CREATININE: CPT

## 2024-07-12 PROCEDURE — 83540 ASSAY OF IRON: CPT

## 2024-07-12 PROCEDURE — 36415 COLL VENOUS BLD VENIPUNCTURE: CPT

## 2024-07-12 PROCEDURE — 84100 ASSAY OF PHOSPHORUS: CPT

## 2024-07-12 PROCEDURE — 82728 ASSAY OF FERRITIN: CPT

## 2024-07-12 PROCEDURE — 80048 BASIC METABOLIC PNL TOTAL CA: CPT

## 2024-07-12 PROCEDURE — 82306 VITAMIN D 25 HYDROXY: CPT

## 2024-07-12 PROCEDURE — 85027 COMPLETE CBC AUTOMATED: CPT

## 2024-07-12 PROCEDURE — 82040 ASSAY OF SERUM ALBUMIN: CPT

## 2024-07-12 PROCEDURE — 84156 ASSAY OF PROTEIN URINE: CPT

## 2024-07-12 PROCEDURE — 81001 URINALYSIS AUTO W/SCOPE: CPT

## 2024-07-12 PROCEDURE — 83550 IRON BINDING TEST: CPT

## 2024-07-13 LAB
CREAT UR-MCNC: 59.91 MG/DL
MICROALBUMIN UR-MCNC: 217.9 MG/DL
MICROALBUMIN/CREAT UR: 3637 MG/G (ref 0–30)

## 2024-07-19 ENCOUNTER — TELEMEDICINE (OUTPATIENT)
Dept: NEPHROLOGY | Facility: MEDICAL CENTER | Age: 89
End: 2024-07-19
Payer: COMMERCIAL

## 2024-07-19 VITALS
HEART RATE: 76 BPM | TEMPERATURE: 98.3 F | DIASTOLIC BLOOD PRESSURE: 61 MMHG | SYSTOLIC BLOOD PRESSURE: 121 MMHG | HEIGHT: 60 IN | BODY MASS INDEX: 24.54 KG/M2 | WEIGHT: 125 LBS | OXYGEN SATURATION: 95 %

## 2024-07-19 DIAGNOSIS — N25.81 HYPERPARATHYROIDISM DUE TO RENAL INSUFFICIENCY (HCC): ICD-10-CM

## 2024-07-19 DIAGNOSIS — E11.21 TYPE 2 DIABETES MELLITUS WITH DIABETIC NEPHROPATHY, WITHOUT LONG-TERM CURRENT USE OF INSULIN (HCC): Chronic | ICD-10-CM

## 2024-07-19 DIAGNOSIS — D63.8 ANEMIA OF CHRONIC DISEASE: ICD-10-CM

## 2024-07-19 DIAGNOSIS — I10 ESSENTIAL HYPERTENSION: Chronic | ICD-10-CM

## 2024-07-19 DIAGNOSIS — N18.4 CKD (CHRONIC KIDNEY DISEASE) STAGE 4, GFR 15-29 ML/MIN (HCC): ICD-10-CM

## 2024-07-19 DIAGNOSIS — Z87.440 HISTORY OF UTI: ICD-10-CM

## 2024-07-19 PROCEDURE — 99214 OFFICE O/P EST MOD 30 MIN: CPT | Mod: 95 | Performed by: INTERNAL MEDICINE

## 2024-07-19 ASSESSMENT — FIBROSIS 4 INDEX: FIB4 SCORE: 1.69

## 2024-07-19 ASSESSMENT — ENCOUNTER SYMPTOMS
SHORTNESS OF BREATH: 0
ABDOMINAL PAIN: 0
FEVER: 0

## 2024-09-03 RX ORDER — FUROSEMIDE 80 MG
80 TABLET ORAL DAILY
Qty: 90 TABLET | Refills: 3 | Status: SHIPPED | OUTPATIENT
Start: 2024-09-03

## 2024-09-05 ENCOUNTER — HOSPITAL ENCOUNTER (OUTPATIENT)
Dept: LAB | Facility: MEDICAL CENTER | Age: 89
End: 2024-09-05
Attending: INTERNAL MEDICINE
Payer: COMMERCIAL

## 2024-09-05 DIAGNOSIS — D63.8 ANEMIA OF CHRONIC DISEASE: ICD-10-CM

## 2024-09-05 DIAGNOSIS — N25.81 HYPERPARATHYROIDISM DUE TO RENAL INSUFFICIENCY (HCC): ICD-10-CM

## 2024-09-05 DIAGNOSIS — Z87.440 HISTORY OF UTI: ICD-10-CM

## 2024-09-05 DIAGNOSIS — N18.4 CKD (CHRONIC KIDNEY DISEASE) STAGE 4, GFR 15-29 ML/MIN (HCC): ICD-10-CM

## 2024-09-05 DIAGNOSIS — E11.21 TYPE 2 DIABETES MELLITUS WITH DIABETIC NEPHROPATHY, WITHOUT LONG-TERM CURRENT USE OF INSULIN (HCC): Chronic | ICD-10-CM

## 2024-09-05 LAB
ALBUMIN SERPL BCP-MCNC: 4.2 G/DL (ref 3.2–4.9)
ANION GAP SERPL CALC-SCNC: 14 MMOL/L (ref 7–16)
APPEARANCE UR: CLEAR
BACTERIA #/AREA URNS HPF: NEGATIVE /HPF
BILIRUB UR QL STRIP.AUTO: NEGATIVE
BUN SERPL-MCNC: 63 MG/DL (ref 8–22)
CALCIUM SERPL-MCNC: 9.7 MG/DL (ref 8.5–10.5)
CHLORIDE SERPL-SCNC: 100 MMOL/L (ref 96–112)
CO2 SERPL-SCNC: 21 MMOL/L (ref 20–33)
COLOR UR: YELLOW
CREAT SERPL-MCNC: 4.19 MG/DL (ref 0.5–1.4)
EPI CELLS #/AREA URNS HPF: NEGATIVE /HPF
ERYTHROCYTE [DISTWIDTH] IN BLOOD BY AUTOMATED COUNT: 43.5 FL (ref 35.9–50)
FERRITIN SERPL-MCNC: 294 NG/ML (ref 10–291)
GFR SERPLBLD CREATININE-BSD FMLA CKD-EPI: 10 ML/MIN/1.73 M 2
GLUCOSE SERPL-MCNC: 142 MG/DL (ref 65–99)
GLUCOSE UR STRIP.AUTO-MCNC: 250 MG/DL
HCT VFR BLD AUTO: 31.1 % (ref 37–47)
HGB BLD-MCNC: 10.4 G/DL (ref 12–16)
HYALINE CASTS #/AREA URNS LPF: ABNORMAL /LPF
IRON SATN MFR SERPL: 21 % (ref 15–55)
IRON SERPL-MCNC: 53 UG/DL (ref 40–170)
KETONES UR STRIP.AUTO-MCNC: NEGATIVE MG/DL
LEUKOCYTE ESTERASE UR QL STRIP.AUTO: ABNORMAL
MCH RBC QN AUTO: 31.8 PG (ref 27–33)
MCHC RBC AUTO-ENTMCNC: 33.4 G/DL (ref 32.2–35.5)
MCV RBC AUTO: 95.1 FL (ref 81.4–97.8)
MICRO URNS: ABNORMAL
NITRITE UR QL STRIP.AUTO: NEGATIVE
PH UR STRIP.AUTO: 6 [PH] (ref 5–8)
PHOSPHATE SERPL-MCNC: 4.8 MG/DL (ref 2.5–4.5)
PLATELET # BLD AUTO: 285 K/UL (ref 164–446)
PMV BLD AUTO: 9.4 FL (ref 9–12.9)
POTASSIUM SERPL-SCNC: 5.2 MMOL/L (ref 3.6–5.5)
PROT UR QL STRIP: 100 MG/DL
PTH-INTACT SERPL-MCNC: 291 PG/ML (ref 14–72)
RBC # BLD AUTO: 3.27 M/UL (ref 4.2–5.4)
RBC # URNS HPF: ABNORMAL /HPF
RBC UR QL AUTO: NEGATIVE
SODIUM SERPL-SCNC: 135 MMOL/L (ref 135–145)
SP GR UR STRIP.AUTO: 1.01
TIBC SERPL-MCNC: 256 UG/DL (ref 250–450)
UIBC SERPL-MCNC: 203 UG/DL (ref 110–370)
UROBILINOGEN UR STRIP.AUTO-MCNC: 0.2 MG/DL
WBC # BLD AUTO: 7.2 K/UL (ref 4.8–10.8)
WBC #/AREA URNS HPF: ABNORMAL /HPF

## 2024-09-05 PROCEDURE — 83550 IRON BINDING TEST: CPT

## 2024-09-05 PROCEDURE — 36415 COLL VENOUS BLD VENIPUNCTURE: CPT

## 2024-09-05 PROCEDURE — 81001 URINALYSIS AUTO W/SCOPE: CPT

## 2024-09-05 PROCEDURE — 80048 BASIC METABOLIC PNL TOTAL CA: CPT

## 2024-09-05 PROCEDURE — 82728 ASSAY OF FERRITIN: CPT

## 2024-09-05 PROCEDURE — 83540 ASSAY OF IRON: CPT

## 2024-09-05 PROCEDURE — 84156 ASSAY OF PROTEIN URINE: CPT

## 2024-09-05 PROCEDURE — 84100 ASSAY OF PHOSPHORUS: CPT

## 2024-09-05 PROCEDURE — 85027 COMPLETE CBC AUTOMATED: CPT

## 2024-09-05 PROCEDURE — 82043 UR ALBUMIN QUANTITATIVE: CPT

## 2024-09-05 PROCEDURE — 83970 ASSAY OF PARATHORMONE: CPT

## 2024-09-05 PROCEDURE — 82040 ASSAY OF SERUM ALBUMIN: CPT

## 2024-09-05 PROCEDURE — 82570 ASSAY OF URINE CREATININE: CPT

## 2024-09-06 LAB
CREAT UR-MCNC: 32.94 MG/DL
CREAT UR-MCNC: 37.46 MG/DL
MICROALBUMIN UR-MCNC: 64.7 MG/DL
MICROALBUMIN/CREAT UR: 1964 MG/G (ref 0–30)
PROT UR-MCNC: 102 MG/DL (ref 0–15)
PROT/CREAT UR: 2723 MG/G (ref 10–107)

## 2024-09-13 ENCOUNTER — TELEMEDICINE (OUTPATIENT)
Dept: NEPHROLOGY | Facility: MEDICAL CENTER | Age: 89
End: 2024-09-13
Payer: COMMERCIAL

## 2024-09-13 VITALS
DIASTOLIC BLOOD PRESSURE: 66 MMHG | WEIGHT: 125 LBS | HEIGHT: 60 IN | TEMPERATURE: 97.3 F | OXYGEN SATURATION: 96 % | SYSTOLIC BLOOD PRESSURE: 132 MMHG | HEART RATE: 70 BPM | BODY MASS INDEX: 24.54 KG/M2

## 2024-09-13 DIAGNOSIS — N18.5 CKD (CHRONIC KIDNEY DISEASE) STAGE 5, GFR LESS THAN 15 ML/MIN (HCC): ICD-10-CM

## 2024-09-13 DIAGNOSIS — Z87.440 HISTORY OF UTI: ICD-10-CM

## 2024-09-13 DIAGNOSIS — N25.81 HYPERPARATHYROIDISM DUE TO RENAL INSUFFICIENCY (HCC): ICD-10-CM

## 2024-09-13 DIAGNOSIS — E11.21 TYPE 2 DIABETES MELLITUS WITH DIABETIC NEPHROPATHY, WITHOUT LONG-TERM CURRENT USE OF INSULIN (HCC): Chronic | ICD-10-CM

## 2024-09-13 DIAGNOSIS — I10 ESSENTIAL HYPERTENSION: Chronic | ICD-10-CM

## 2024-09-13 DIAGNOSIS — D63.8 ANEMIA OF CHRONIC DISEASE: ICD-10-CM

## 2024-09-13 PROCEDURE — 99214 OFFICE O/P EST MOD 30 MIN: CPT | Performed by: INTERNAL MEDICINE

## 2024-09-13 ASSESSMENT — ENCOUNTER SYMPTOMS
SHORTNESS OF BREATH: 0
ABDOMINAL PAIN: 0
FEVER: 0

## 2024-09-13 ASSESSMENT — FIBROSIS 4 INDEX: FIB4 SCORE: 1.55

## 2024-09-13 NOTE — PROGRESS NOTES
"Chief Complaint:   Chief Complaint   Patient presents with    Chronic Kidney Disease   Follow up CKD    This evaluation was conducted via Teams using secure and encrypted videoconferencing technology. The patient was in their home in the [facility].    The patient's identity was confirmed and verbal consent was obtained for this virtual visit.           History of Present Illness:    Ivette Castelan is a 88 y.o. female with DM2, HTN, CKD5 who presents today for follow-up.    She moved from [place] in June, 2022 to [place], [place] to be closer to family.        Re: DM2, diagnosed 1975 when she was overweight. Patient has had microalbuminuria since at least 2010, and macroalbuminuria since 2016. Patient has seen an eye doctor and does not have retinopathy. She has had periods of good control of DM and some periods of poor control. She is tolerating farxiga well, denies yeast infections. Sugar is \"sometimes good and sometimes not too good.\"     Re: HTN, diagnosed around 1975. BP control over the years has been fairly controlled. She checks BP at home, usually 130-140's systolic. She started having LE edema in 9/2024 and a wet cough. She started lasix 80mg daily and she feels diuretic effect. LE edema improved and wet cough also improved. She remains on losartan and metoprolol.     Re: CKD, she has been seeing a nephrologist in [place], [place] since 2022. She had 3 pregnancies without pre-eclampsia or DM. She denies history of RYAN, kidney stone. She can empty her bladder but is incontinent at times. Denies NSAIDs. Her appetite is \"I have a good appetite.\"  Weight the same around 125 lbs. Sleeps \"about 3 hours at a time, but I take a lot of naps.\" Denies daily headache, N/V, metallic taste.            ROS:    Review of Systems   Constitutional:  Negative for fever.   Respiratory:  Negative for shortness of breath.    Cardiovascular:  Negative for chest pain.   Gastrointestinal:  Negative for abdominal pain.   Genitourinary:  " Negative for dysuria and hematuria.   All other systems reviewed and are negative.                  Past Medical History:  Past Medical History:   Diagnosis Date    Arthritis     Blood transfusion without reported diagnosis     Cataract     Diabetes (HCC)     Kidney disease      Past Surgical History:   Procedure Laterality Date    BLADDER SLING FEMALE      CATARACT EXTRACTION WITH IOL          Current Outpatient Medications   Medication Sig Dispense Refill    furosemide (LASIX) 80 MG Tab Take 1 Tablet by mouth every day. 90 Tablet 3    metoprolol SR (TOPROL XL) 50 MG TABLET SR 24 HR Take 1 Tablet by mouth every day. 90 Tablet 3    atorvastatin (LIPITOR) 80 MG tablet Take 1 Tablet by mouth every day. 90 Tablet 3    losartan (COZAAR) 100 MG Tab Take 1 Tablet by mouth every day. 90 Tablet 3    dapagliflozin propanediol (FARXIGA) 5 MG Tab Take 1 Tablet by mouth every day. 90 Tablet 3    BIOTIN 5000 PO Take  by mouth.      Cholecalciferol (D3-1000) 1000 UNIT Cap Take  by mouth.      Cyanocobalamin (B-12) 1000 MCG Cap Take  by mouth.      Omega-3 Fatty Acids (OMEGA 3 PO) Take  by mouth.      Multiple Vitamins-Minerals (WOMENS MULTIVITAMIN PO) Take  by mouth.       No current facility-administered medications for this visit.               Allergies:   No Known Allergies     Current Outpatient Medications:   (Not in a hospital admission)            Physical Exam:   Vitals obtained by patient:  Vitals:    09/13/24 1550   BP: 132/66   BP Location: Left arm   Patient Position: Sitting   BP Cuff Size: Adult   Pulse: 70   Temp: 36.3 °C (97.3 °F)   TempSrc: Temporal   SpO2: 96%   Weight: 56.7 kg (125 lb)   Height: 1.524 m (5')         Physical Exam:  Constitutional: Alert, no distress, well-groomed.  Skin: No rashes in visible areas.  Eye: Round. Conjunctiva clear, lids normal. No icterus.   ENMT: Lips pink without lesions, good dentition, moist mucous membranes. Phonation normal.  Neck: No masses, no thyromegaly. Moves freely  without pain.  Respiratory: Unlabored respiratory effort, no cough or audible wheeze  Psych: Alert and oriented x3, normal affect and mood.              Imaging reviewed & Visualized by me:  DIAGNOSTIC IMAGING REVIEWED AND/OR INTERPRETED BY ME:        Laboratory:   Recent Labs     03/12/24  1304 07/12/24  1115 09/05/24  1329   ALBUMIN 4.0 4.1 4.2   SODIUM 141 140 135   POTASSIUM 4.9 5.3 5.2   CHLORIDE 107 105 100   CO2 21 20 21   BUN 40* 51* 63*   CREATININE 3.24* 3.69* 4.19*   PHOSPHORUS 3.7 4.3 4.8*     Lab Results   Component Value Date/Time    WBC 7.2 09/05/2024 01:29 PM    RBC 3.27 (L) 09/05/2024 01:29 PM    HEMOGLOBIN 10.4 (L) 09/05/2024 01:29 PM    HEMATOCRIT 31.1 (L) 09/05/2024 01:29 PM    MCV 95.1 09/05/2024 01:29 PM    MCH 31.8 09/05/2024 01:29 PM    MCHC 33.4 09/05/2024 01:29 PM    MPV 9.4 09/05/2024 01:29 PM        URINALYSIS:  Lab Results   Component Value Date/Time    COLORURINE Yellow 09/05/2024 1329    CLARITY Clear 09/05/2024 1329    SPECGRAVITY 1.012 09/05/2024 1329    PHURINE 6.0 09/05/2024 1329    KETONES Negative 09/05/2024 1329    PROTEINURIN 100 (A) 09/05/2024 1329    BILIRUBINUR Negative 09/05/2024 1329    UROBILU 0.2 09/05/2024 1329    NITRITE Negative 09/05/2024 1329    LEUKESTERAS Trace (A) 09/05/2024 1329    OCCULTBLOOD Negative 09/05/2024 1329       UPC  Lab Results   Component Value Date/Time    TOTPROTUR 102.0 (H) 09/05/2024 1329      Lab Results   Component Value Date/Time    CREATININEU 37.46 09/05/2024 1329              Assessment and Plan:   Ivette Castelan is a 88 y.o. female with DM2, HTN, CKD5 who presents today for follow-up.    1. CKD (chronic kidney disease) stage 5  -Creatinine and GFR worsening within stage V CKD.   Her underlying CKD likely from diabetic nephropathy, hypertension, age-related kidney decline.  I recommend avoiding NSAIDs and other nephrotoxins.  I recommend a low-sodium diet.  I explained the importance of glycemic and blood pressure control to help slow CKD  progression.  I recommend maintaining Farxiga and losartan for long-term kidney protection.  She remains at very high risk of CKD progression to ESRD with her near nephrotic range proteinuria.  She continues to think she would try hemodialysis, but is unsure about the long-term.  Therefore, I recommend a time-limited trial of 2 to 4 weeks of catheter-based hemodialysis if or when the time comes that she needs dialysis.  Fortunately, there is no acute need for dialysis at this time, as she has no uremic symptoms, no major metabolic abnormalities, and no volume overload (within the confines of virtual visit examination).  Patient was cautioned of the uremic symptoms she would experience if or when her kidney failure progresses to the point of needing dialysis.      KFRE 2-Year: 57.7% at 9/5/2024  1:29 PM  Calculated from:  Serum Creatinine: 4.19 mg/dL at 9/5/2024  1:29 PM  Urine Albumin Creatinine Ratio: 1,964 mg/g at 9/5/2024  1:29 PM  Age: 88 years  Sex: Female at 9/5/2024  1:29 PM  Has CKD-3 to CKD-5: Yes     KFRE 5-Year: 93.2% at 9/5/2024  1:29 PM  Calculated from:  Serum Creatinine: 4.19 mg/dL at 9/5/2024  1:29 PM  Urine Albumin Creatinine Ratio: 1,964 mg/g at 9/5/2024  1:29 PM  Age: 88 years  Sex: Female at 9/5/2024  1:29 PM  Has CKD-3 to CKD-5: Yes      2. Type 2 diabetes mellitus with diabetic nephropathy, without long-term current use of insulin (HCC)  -Controlled per patient.  I recommend maintaining Farxiga and losartan for long-term kidney protection.  Avoid metformin with GFR less than 30.    3. Essential hypertension  -Mildly uncontrolled.  Continue metoprolol, losartan, Lasix 80 mg daily.  Amlodipine was discontinued when Lasix was added in early September 2024.    4. Anemia, unspecified type  -Persists, but patient is not iron deficient.  Anemia due to anemia of chronic disease.  There remains no acute need for ALYCE injections with hemoglobin over 10.  Continue to monitor CBC and iron studies.    5.  Hyperparathyroidism  -PTH worsening, but still within range for her degree of CKD stage V.  No acute need for calcitriol.        Return to clinic in 2-3 months with pre-clinic      Franko Green MD  Nephrology  [facility]

## 2024-11-19 ENCOUNTER — APPOINTMENT (OUTPATIENT)
Dept: LAB | Facility: MEDICAL CENTER | Age: 89
End: 2024-11-19
Payer: COMMERCIAL

## 2024-11-21 ENCOUNTER — HOSPITAL ENCOUNTER (OUTPATIENT)
Dept: LAB | Facility: MEDICAL CENTER | Age: 89
End: 2024-11-21
Attending: INTERNAL MEDICINE
Payer: COMMERCIAL

## 2024-11-21 DIAGNOSIS — N25.81 HYPERPARATHYROIDISM DUE TO RENAL INSUFFICIENCY (HCC): ICD-10-CM

## 2024-11-21 DIAGNOSIS — Z87.440 HISTORY OF UTI: ICD-10-CM

## 2024-11-21 DIAGNOSIS — D63.8 ANEMIA OF CHRONIC DISEASE: ICD-10-CM

## 2024-11-21 DIAGNOSIS — N18.5 CKD (CHRONIC KIDNEY DISEASE) STAGE 5, GFR LESS THAN 15 ML/MIN (HCC): ICD-10-CM

## 2024-11-21 DIAGNOSIS — E11.21 TYPE 2 DIABETES MELLITUS WITH DIABETIC NEPHROPATHY, WITHOUT LONG-TERM CURRENT USE OF INSULIN (HCC): Chronic | ICD-10-CM

## 2024-11-21 LAB
APPEARANCE UR: CLEAR
BACTERIA #/AREA URNS HPF: NORMAL /HPF
BILIRUB UR QL STRIP.AUTO: NEGATIVE
CASTS URNS QL MICRO: NORMAL /LPF (ref 0–2)
COLOR UR: YELLOW
EPITHELIAL CELLS 1715: NORMAL /HPF (ref 0–5)
ERYTHROCYTE [DISTWIDTH] IN BLOOD BY AUTOMATED COUNT: 43.6 FL (ref 35.9–50)
GLUCOSE UR STRIP.AUTO-MCNC: 250 MG/DL
HCT VFR BLD AUTO: 32.3 % (ref 37–47)
HGB BLD-MCNC: 10.5 G/DL (ref 12–16)
KETONES UR STRIP.AUTO-MCNC: NEGATIVE MG/DL
LEUKOCYTE ESTERASE UR QL STRIP.AUTO: NEGATIVE
MCH RBC QN AUTO: 31.3 PG (ref 27–33)
MCHC RBC AUTO-ENTMCNC: 32.5 G/DL (ref 32.2–35.5)
MCV RBC AUTO: 96.4 FL (ref 81.4–97.8)
MICRO URNS: ABNORMAL
NITRITE UR QL STRIP.AUTO: NEGATIVE
PH UR STRIP.AUTO: 6 [PH] (ref 5–8)
PLATELET # BLD AUTO: 286 K/UL (ref 164–446)
PMV BLD AUTO: 9.7 FL (ref 9–12.9)
PROT UR QL STRIP: 100 MG/DL
PTH-INTACT SERPL-MCNC: 265 PG/ML (ref 14–72)
RBC # BLD AUTO: 3.35 M/UL (ref 4.2–5.4)
RBC # URNS HPF: NORMAL /HPF (ref 0–2)
RBC UR QL AUTO: NEGATIVE
SP GR UR STRIP.AUTO: 1.01
UROBILINOGEN UR STRIP.AUTO-MCNC: 0.2 EU/DL
WBC # BLD AUTO: 6.5 K/UL (ref 4.8–10.8)
WBC #/AREA URNS HPF: NORMAL /HPF

## 2024-11-21 PROCEDURE — 82570 ASSAY OF URINE CREATININE: CPT

## 2024-11-21 PROCEDURE — 84156 ASSAY OF PROTEIN URINE: CPT

## 2024-11-21 PROCEDURE — 83540 ASSAY OF IRON: CPT

## 2024-11-21 PROCEDURE — 81001 URINALYSIS AUTO W/SCOPE: CPT

## 2024-11-21 PROCEDURE — 82040 ASSAY OF SERUM ALBUMIN: CPT

## 2024-11-21 PROCEDURE — 82728 ASSAY OF FERRITIN: CPT

## 2024-11-21 PROCEDURE — 82306 VITAMIN D 25 HYDROXY: CPT

## 2024-11-21 PROCEDURE — 36415 COLL VENOUS BLD VENIPUNCTURE: CPT

## 2024-11-21 PROCEDURE — 85027 COMPLETE CBC AUTOMATED: CPT

## 2024-11-21 PROCEDURE — 80048 BASIC METABOLIC PNL TOTAL CA: CPT

## 2024-11-21 PROCEDURE — 83550 IRON BINDING TEST: CPT

## 2024-11-21 PROCEDURE — 84100 ASSAY OF PHOSPHORUS: CPT

## 2024-11-21 PROCEDURE — 82043 UR ALBUMIN QUANTITATIVE: CPT

## 2024-11-21 PROCEDURE — 83970 ASSAY OF PARATHORMONE: CPT

## 2024-11-22 LAB
25(OH)D3 SERPL-MCNC: 65 NG/ML (ref 30–100)
ALBUMIN SERPL BCP-MCNC: 4.3 G/DL (ref 3.2–4.9)
ANION GAP SERPL CALC-SCNC: 14 MMOL/L (ref 7–16)
BUN SERPL-MCNC: 72 MG/DL (ref 8–22)
CALCIUM SERPL-MCNC: 9.4 MG/DL (ref 8.5–10.5)
CHLORIDE SERPL-SCNC: 103 MMOL/L (ref 96–112)
CO2 SERPL-SCNC: 22 MMOL/L (ref 20–33)
CREAT SERPL-MCNC: 4.75 MG/DL (ref 0.5–1.4)
CREAT UR-MCNC: 39.44 MG/DL
CREAT UR-MCNC: 43 MG/DL
FERRITIN SERPL-MCNC: 284 NG/ML (ref 10–291)
GFR SERPLBLD CREATININE-BSD FMLA CKD-EPI: 8 ML/MIN/1.73 M 2
GLUCOSE SERPL-MCNC: 129 MG/DL (ref 65–99)
IRON SATN MFR SERPL: 30 % (ref 15–55)
IRON SERPL-MCNC: 78 UG/DL (ref 40–170)
MICROALBUMIN UR-MCNC: 56.8 MG/DL
MICROALBUMIN/CREAT UR: 1440 MG/G (ref 0–30)
PHOSPHATE SERPL-MCNC: 5.1 MG/DL (ref 2.5–4.5)
POTASSIUM SERPL-SCNC: 4.7 MMOL/L (ref 3.6–5.5)
PROT UR-MCNC: 95.4 MG/DL (ref 0–15)
PROT/CREAT UR: 2219 MG/G (ref 10–107)
SODIUM SERPL-SCNC: 139 MMOL/L (ref 135–145)
TIBC SERPL-MCNC: 261 UG/DL (ref 250–450)
UIBC SERPL-MCNC: 183 UG/DL (ref 110–370)

## 2024-12-05 ENCOUNTER — TELEMEDICINE (OUTPATIENT)
Dept: NEPHROLOGY | Facility: MEDICAL CENTER | Age: 89
End: 2024-12-05
Payer: COMMERCIAL

## 2024-12-05 VITALS
BODY MASS INDEX: 23.36 KG/M2 | HEIGHT: 60 IN | SYSTOLIC BLOOD PRESSURE: 144 MMHG | DIASTOLIC BLOOD PRESSURE: 80 MMHG | WEIGHT: 119 LBS | OXYGEN SATURATION: 99 % | TEMPERATURE: 98 F | HEART RATE: 60 BPM

## 2024-12-05 DIAGNOSIS — E11.21 TYPE 2 DIABETES MELLITUS WITH DIABETIC NEPHROPATHY, WITHOUT LONG-TERM CURRENT USE OF INSULIN (HCC): Chronic | ICD-10-CM

## 2024-12-05 DIAGNOSIS — N18.5 CKD (CHRONIC KIDNEY DISEASE) STAGE 5, GFR LESS THAN 15 ML/MIN (HCC): ICD-10-CM

## 2024-12-05 DIAGNOSIS — Z87.440 HISTORY OF UTI: ICD-10-CM

## 2024-12-05 DIAGNOSIS — I10 ESSENTIAL HYPERTENSION: Chronic | ICD-10-CM

## 2024-12-05 DIAGNOSIS — D63.8 ANEMIA OF CHRONIC DISEASE: ICD-10-CM

## 2024-12-05 DIAGNOSIS — N25.81 HYPERPARATHYROIDISM DUE TO RENAL INSUFFICIENCY (HCC): ICD-10-CM

## 2024-12-05 PROCEDURE — 99214 OFFICE O/P EST MOD 30 MIN: CPT | Mod: 95 | Performed by: INTERNAL MEDICINE

## 2024-12-05 RX ORDER — AMLODIPINE BESYLATE 10 MG/1
10 TABLET ORAL DAILY
COMMUNITY
End: 2024-12-05

## 2024-12-05 ASSESSMENT — ENCOUNTER SYMPTOMS
SHORTNESS OF BREATH: 0
FEVER: 0
ABDOMINAL PAIN: 0

## 2024-12-05 ASSESSMENT — FIBROSIS 4 INDEX: FIB4 SCORE: 1.55

## 2024-12-05 NOTE — PROGRESS NOTES
"Chief Complaint:   Chief Complaint   Patient presents with    Chronic Kidney Disease   Follow up CKD    This evaluation was conducted via Teams using secure and encrypted videoconferencing technology. The patient was in their home in the [facility].    The patient's identity was confirmed and verbal consent was obtained for this virtual visit.       History of Present Illness:    Ivette Castelan is a 88 y.o. female with DM2, HTN, CKD5 who presents today for follow-up.    She moved from [place] to [place], [place]  in June, 2022 in order to be closer to family.        Re: DM2, diagnosed 1975 when she was overweight. Patient has had microalbuminuria since at least 2010, and macroalbuminuria since 2016. Patient has seen an eye doctor and does not have retinopathy. She has had periods of good control of DM and some periods of poor control. She remains on farxiga, denies yeast infections. Sugar is \"not the best.\"     Re: HTN, diagnosed around 1975. BP control over the years has been fairly controlled. She checks BP at home, averaging 135-145 / 70-80. She started having LE edema in 9/2024 and a wet cough. She started lasix 80mg daily and she feels diuretic effect. LE edema improved. Remains on losartan and metoprolol.     Re: CKD, she has been seeing a nephrologist in [place], [place] since 2022. She had 3 pregnancies without pre-eclampsia or DM. She denies history of RYAN, kidney stone. She can empty her bladder but is incontinent at times. Denies NSAIDs. Her appetite is \"very good.\"  Weight down 6 lbs unintentionally, wonders if it is due to water weight loss. Denies daily headache, N/V, metallic taste.            ROS:    Review of Systems   Constitutional:  Negative for fever.   Respiratory:  Negative for shortness of breath.    Cardiovascular:  Negative for chest pain.   Gastrointestinal:  Negative for abdominal pain.   Genitourinary:  Negative for dysuria and hematuria.   All other systems reviewed and are negative.          "         Past Medical History:  Past Medical History:   Diagnosis Date    Arthritis     Blood transfusion without reported diagnosis     Cataract     Diabetes (HCC)     Kidney disease      Past Surgical History:   Procedure Laterality Date    BLADDER SLING FEMALE      CATARACT EXTRACTION WITH IOL          Current Outpatient Medications   Medication Sig Dispense Refill    furosemide (LASIX) 80 MG Tab Take 1 Tablet by mouth every day. 90 Tablet 3    metoprolol SR (TOPROL XL) 50 MG TABLET SR 24 HR Take 1 Tablet by mouth every day. 90 Tablet 3    atorvastatin (LIPITOR) 80 MG tablet Take 1 Tablet by mouth every day. 90 Tablet 3    losartan (COZAAR) 100 MG Tab Take 1 Tablet by mouth every day. 90 Tablet 3    dapagliflozin propanediol (FARXIGA) 5 MG Tab Take 1 Tablet by mouth every day. 90 Tablet 3    BIOTIN 5000 PO Take  by mouth.      Cholecalciferol (D3-1000) 1000 UNIT Cap Take  by mouth.      Cyanocobalamin (B-12) 1000 MCG Cap Take  by mouth.      Omega-3 Fatty Acids (OMEGA 3 PO) Take  by mouth.      Multiple Vitamins-Minerals (WOMENS MULTIVITAMIN PO) Take  by mouth.       No current facility-administered medications for this visit.               Allergies:   No Known Allergies     Current Outpatient Medications:   (Not in a hospital admission)            Physical Exam:   Vitals obtained by patient:  Vitals:    12/05/24 1441   BP: (!) 144/80   BP Location: Left arm   Patient Position: Sitting   BP Cuff Size: Adult   Pulse: 60   Temp: 36.7 °C (98 °F)   TempSrc: Temporal   SpO2: 99%   Weight: 54 kg (119 lb)   Height: 1.524 m (5')              Physical Exam:  Constitutional: Alert, no distress, well-groomed.  Skin: No rashes in visible areas.  Eye: Round. Conjunctiva clear, lids normal. No icterus.   ENMT: Lips pink without lesions, good dentition, moist mucous membranes. Phonation normal.  Neck: No masses, no thyromegaly. Moves freely without pain.  Respiratory: Unlabored respiratory effort, no cough or audible  wheeze  Psych: Alert and oriented x3, normal affect and mood.              Imaging reviewed & Visualized by me:  DIAGNOSTIC IMAGING REVIEWED AND/OR INTERPRETED BY ME:        Laboratory:   Recent Labs     07/12/24  1115 09/05/24  1329 11/21/24  1328   ALBUMIN 4.1 4.2 4.3   SODIUM 140 135 139   POTASSIUM 5.3 5.2 4.7   CHLORIDE 105 100 103   CO2 20 21 22   BUN 51* 63* 72*   CREATININE 3.69* 4.19* 4.75*   PHOSPHORUS 4.3 4.8* 5.1*     Lab Results   Component Value Date/Time    WBC 6.5 11/21/2024 01:28 PM    RBC 3.35 (L) 11/21/2024 01:28 PM    HEMOGLOBIN 10.5 (L) 11/21/2024 01:28 PM    HEMATOCRIT 32.3 (L) 11/21/2024 01:28 PM    MCV 96.4 11/21/2024 01:28 PM    MCH 31.3 11/21/2024 01:28 PM    MCHC 32.5 11/21/2024 01:28 PM    MPV 9.7 11/21/2024 01:28 PM        URINALYSIS:  Lab Results   Component Value Date/Time    COLORURINE Yellow 11/21/2024 1328    CLARITY Clear 11/21/2024 1328    SPECGRAVITY 1.012 11/21/2024 1328    PHURINE 6.0 11/21/2024 1328    KETONES Negative 11/21/2024 1328    PROTEINURIN 100 (A) 11/21/2024 1328    BILIRUBINUR Negative 11/21/2024 1328    UROBILU 0.2 11/21/2024 1328    NITRITE Negative 11/21/2024 1328    LEUKESTERAS Negative 11/21/2024 1328    OCCULTBLOOD Negative 11/21/2024 1328       UPC  Lab Results   Component Value Date/Time    TOTPROTUR 95.4 (H) 11/21/2024 1328      Lab Results   Component Value Date/Time    CREATININEU 43.00 11/21/2024 1328              Assessment and Plan:   Ivette Castelan is a 88 y.o. female with DM2, HTN, CKD5 who presents today for follow-up.    1. CKD (chronic kidney disease) stage 5  -Creatinine and GFR are worsening within stage V CKD.   Her underlying CKD likely from diabetic nephropathy, hypertension, age-related kidney decline.  I recommend avoiding NSAIDs and other nephrotoxins.  I recommend a low-sodium diet. I explained the importance of glycemic and blood pressure control to help slow CKD progression.   I recommend maintaining Farxiga and losartan for long-term kidney  protection.  She remains at very high risk of CKD progression to ESRD with her near nephrotic range proteinuria.  She remains unsure about wanting dialysis long-term, but thinks that she would try it short-term.   Therefore, if or when the time comes that she needs dialysis, I would recommend a time-limited trial of 2 to 4 weeks of catheter-based hemodialysis.  Fortunately, there remains no acute need for dialysis at this time, as she has no uremic symptoms, and no major metabolic abnormalities, and no volume overload (within the confines of virtual visit examination).  Patient was made aware of the uremic symptoms she would experience if or when her kidney failure progresses to the point of needing dialysis.  She was able to repeat back to me the symptoms of uremia.      KFRE 2-Year: 60.8% at 11/21/2024  1:28 PM  Calculated from:  Serum Creatinine: 4.75 mg/dL at 11/21/2024  1:28 PM  Urine Albumin Creatinine Ratio: 1,440 mg/g at 11/21/2024  1:28 PM  Age: 88 years  Sex: Female at 11/21/2024  1:28 PM  Has CKD-3 to CKD-5: Yes     KFRE 5-Year: 94.6% at 11/21/2024  1:28 PM  Calculated from:  Serum Creatinine: 4.75 mg/dL at 11/21/2024  1:28 PM  Urine Albumin Creatinine Ratio: 1,440 mg/g at 11/21/2024  1:28 PM  Age: 88 years  Sex: Female at 11/21/2024  1:28 PM  Has CKD-3 to CKD-5: Yes      2. Type 2 diabetes mellitus with diabetic nephropathy, without long-term current use of insulin (HCC)  -Uncontrolled (with hyperglycemia) per the patient, but glucose level fairly well-controlled on most recent labs.  I recommend maintaining Farxiga and losartan for long-term kidney protection.  Avoid metformin with GFR less than 30.    3. Essential hypertension  -Uncontrolled.  Continue metoprolol, losartan, Lasix 80 mg daily.   Amlodipine was discontinued when Lasix was added in early September 2024.  If hypertension and lower extremity edema worsen further, I would recommend increasing furosemide from 80 mg to 240 mg once daily.    4.  Anemia, unspecified type  -Due to anemia of chronic disease.  Persistent.  But with hemoglobin over 10, there remains no acute need for ALYCE therapy.  Continue to monitor CBC and iron studies.    5. Hyperparathyroidism  -As expected for her degree of CKD.  No acute need for calcitriol.        Return to clinic in 1-2 months with pre-clinic      Franko Green MD  Nephrology  [facility]

## 2025-01-29 ENCOUNTER — HOSPITAL ENCOUNTER (OUTPATIENT)
Dept: LAB | Facility: MEDICAL CENTER | Age: 89
End: 2025-01-29
Attending: INTERNAL MEDICINE
Payer: COMMERCIAL

## 2025-01-29 DIAGNOSIS — D63.8 ANEMIA OF CHRONIC DISEASE: ICD-10-CM

## 2025-01-29 DIAGNOSIS — E11.21 TYPE 2 DIABETES MELLITUS WITH DIABETIC NEPHROPATHY, WITHOUT LONG-TERM CURRENT USE OF INSULIN (HCC): Chronic | ICD-10-CM

## 2025-01-29 DIAGNOSIS — N18.5 CKD (CHRONIC KIDNEY DISEASE) STAGE 5, GFR LESS THAN 15 ML/MIN (HCC): ICD-10-CM

## 2025-01-29 DIAGNOSIS — Z87.440 HISTORY OF UTI: ICD-10-CM

## 2025-01-29 DIAGNOSIS — N25.81 HYPERPARATHYROIDISM DUE TO RENAL INSUFFICIENCY (HCC): ICD-10-CM

## 2025-01-29 LAB
25(OH)D3 SERPL-MCNC: 74 NG/ML (ref 30–100)
ALBUMIN SERPL BCP-MCNC: 4.2 G/DL (ref 3.2–4.9)
ANION GAP SERPL CALC-SCNC: 18 MMOL/L (ref 7–16)
APPEARANCE UR: CLEAR
BACTERIA #/AREA URNS HPF: ABNORMAL /HPF
BILIRUB UR QL STRIP.AUTO: NEGATIVE
BUN SERPL-MCNC: 55 MG/DL (ref 8–22)
CALCIUM SERPL-MCNC: 8.6 MG/DL (ref 8.5–10.5)
CASTS URNS QL MICRO: ABNORMAL /LPF (ref 0–2)
CHLORIDE SERPL-SCNC: 102 MMOL/L (ref 96–112)
CO2 SERPL-SCNC: 20 MMOL/L (ref 20–33)
COLOR UR: YELLOW
CREAT SERPL-MCNC: 4.62 MG/DL (ref 0.5–1.4)
CREAT UR-MCNC: 46.95 MG/DL
EPITHELIAL CELLS 1715: ABNORMAL /HPF (ref 0–5)
ERYTHROCYTE [DISTWIDTH] IN BLOOD BY AUTOMATED COUNT: 42.2 FL (ref 35.9–50)
GFR SERPLBLD CREATININE-BSD FMLA CKD-EPI: 9 ML/MIN/1.73 M 2
GLUCOSE SERPL-MCNC: 165 MG/DL (ref 65–99)
GLUCOSE UR STRIP.AUTO-MCNC: 500 MG/DL
HCT VFR BLD AUTO: 29.3 % (ref 37–47)
HGB BLD-MCNC: 9.7 G/DL (ref 12–16)
IRON SATN MFR SERPL: 24 % (ref 15–55)
IRON SERPL-MCNC: 56 UG/DL (ref 40–170)
KETONES UR STRIP.AUTO-MCNC: NEGATIVE MG/DL
LEUKOCYTE ESTERASE UR QL STRIP.AUTO: ABNORMAL
MCH RBC QN AUTO: 31.7 PG (ref 27–33)
MCHC RBC AUTO-ENTMCNC: 33.1 G/DL (ref 32.2–35.5)
MCV RBC AUTO: 95.8 FL (ref 81.4–97.8)
MICRO URNS: ABNORMAL
NITRITE UR QL STRIP.AUTO: NEGATIVE
PH UR STRIP.AUTO: 6.5 [PH] (ref 5–8)
PHOSPHATE SERPL-MCNC: 6 MG/DL (ref 2.5–4.5)
PLATELET # BLD AUTO: 273 K/UL (ref 164–446)
PMV BLD AUTO: 9.6 FL (ref 9–12.9)
POTASSIUM SERPL-SCNC: 4.8 MMOL/L (ref 3.6–5.5)
PROT UR QL STRIP: 300 MG/DL
PROT UR-MCNC: 141 MG/DL (ref 0–15)
PROT/CREAT UR: 3003 MG/G (ref 10–107)
PTH-INTACT SERPL-MCNC: 393 PG/ML (ref 14–72)
RBC # BLD AUTO: 3.06 M/UL (ref 4.2–5.4)
RBC # URNS HPF: ABNORMAL /HPF (ref 0–2)
RBC UR QL AUTO: NEGATIVE
SODIUM SERPL-SCNC: 140 MMOL/L (ref 135–145)
SP GR UR STRIP.AUTO: 1.01
TIBC SERPL-MCNC: 237 UG/DL (ref 250–450)
UIBC SERPL-MCNC: 181 UG/DL (ref 110–370)
UROBILINOGEN UR STRIP.AUTO-MCNC: 0.2 EU/DL
WBC # BLD AUTO: 7 K/UL (ref 4.8–10.8)
WBC #/AREA URNS HPF: ABNORMAL /HPF

## 2025-01-29 PROCEDURE — 85027 COMPLETE CBC AUTOMATED: CPT

## 2025-01-29 PROCEDURE — 80048 BASIC METABOLIC PNL TOTAL CA: CPT

## 2025-01-29 PROCEDURE — 83970 ASSAY OF PARATHORMONE: CPT

## 2025-01-29 PROCEDURE — 84100 ASSAY OF PHOSPHORUS: CPT

## 2025-01-29 PROCEDURE — 81001 URINALYSIS AUTO W/SCOPE: CPT

## 2025-01-29 PROCEDURE — 82043 UR ALBUMIN QUANTITATIVE: CPT

## 2025-01-29 PROCEDURE — 82040 ASSAY OF SERUM ALBUMIN: CPT

## 2025-01-29 PROCEDURE — 83550 IRON BINDING TEST: CPT

## 2025-01-29 PROCEDURE — 84156 ASSAY OF PROTEIN URINE: CPT

## 2025-01-29 PROCEDURE — 82728 ASSAY OF FERRITIN: CPT

## 2025-01-29 PROCEDURE — 83540 ASSAY OF IRON: CPT

## 2025-01-29 PROCEDURE — 82306 VITAMIN D 25 HYDROXY: CPT

## 2025-01-29 PROCEDURE — 36415 COLL VENOUS BLD VENIPUNCTURE: CPT

## 2025-01-29 PROCEDURE — 82570 ASSAY OF URINE CREATININE: CPT | Mod: 91

## 2025-01-30 LAB
CREAT UR-MCNC: 46.69 MG/DL
FERRITIN SERPL-MCNC: 247 NG/ML (ref 10–291)
MICROALBUMIN UR-MCNC: 85 MG/DL
MICROALBUMIN/CREAT UR: 1821 MG/G (ref 0–30)

## 2025-02-06 ENCOUNTER — APPOINTMENT (OUTPATIENT)
Dept: NEPHROLOGY | Facility: MEDICAL CENTER | Age: 89
End: 2025-02-06
Payer: COMMERCIAL

## 2025-02-06 VITALS
DIASTOLIC BLOOD PRESSURE: 65 MMHG | OXYGEN SATURATION: 95 % | SYSTOLIC BLOOD PRESSURE: 128 MMHG | WEIGHT: 120 LBS | HEIGHT: 60 IN | BODY MASS INDEX: 23.56 KG/M2 | TEMPERATURE: 98.3 F | HEART RATE: 79 BPM

## 2025-02-06 DIAGNOSIS — D63.8 ANEMIA OF CHRONIC DISEASE: ICD-10-CM

## 2025-02-06 DIAGNOSIS — E11.21 TYPE 2 DIABETES MELLITUS WITH DIABETIC NEPHROPATHY, WITHOUT LONG-TERM CURRENT USE OF INSULIN (HCC): Chronic | ICD-10-CM

## 2025-02-06 DIAGNOSIS — I10 ESSENTIAL HYPERTENSION: Chronic | ICD-10-CM

## 2025-02-06 DIAGNOSIS — Z87.440 HISTORY OF UTI: ICD-10-CM

## 2025-02-06 DIAGNOSIS — N25.81 HYPERPARATHYROIDISM DUE TO RENAL INSUFFICIENCY (HCC): ICD-10-CM

## 2025-02-06 DIAGNOSIS — N18.5 CKD (CHRONIC KIDNEY DISEASE) STAGE 5, GFR LESS THAN 15 ML/MIN (HCC): ICD-10-CM

## 2025-02-06 PROCEDURE — 99214 OFFICE O/P EST MOD 30 MIN: CPT | Mod: 95 | Performed by: INTERNAL MEDICINE

## 2025-02-06 ASSESSMENT — ENCOUNTER SYMPTOMS
ABDOMINAL PAIN: 0
FEVER: 0
SHORTNESS OF BREATH: 0

## 2025-02-06 NOTE — Clinical Note
Please print and fax my note to ORESTES Alcala at [facility]. Please underline and/or star the section in problem #1 where I say she no longer would want dialysis.  Thanks!

## 2025-02-06 NOTE — PROGRESS NOTES
"Chief Complaint:   Chief Complaint   Patient presents with    Chronic Kidney Disease   Follow up CKD    This evaluation was conducted via Teams using secure and encrypted videoconferencing technology. The patient was in their home in the [facility].    The patient's identity was confirmed and verbal consent was obtained for this virtual visit.       History of Present Illness:    Ivette Castelan is a 89 y.o. female with DM2, HTN, CKD5 who presents today for follow-up.    She moved from [place] to [place], [place]  in June, 2022 in order to be closer to family.     Re: DM2, diagnosed 1975 when she was overweight. Patient has had microalbuminuria since at least 2010, and macroalbuminuria since 2016. Patient has seen an eye doctor and does not have retinopathy. She has had periods of good control of DM and some periods of poor control. She remains on farxiga, denies yeast infections. Sugar is \"trying to get back on track.\"     Re: HTN, diagnosed around 1975. BP control over the years has been fairly controlled. She checks BP at home, usually 130's / 70's. She started having LE edema in 9/2024 and a wet cough. She remains on lasix 80mg daily and she feels diuretic effect. Denies LE edema. Remains on losartan and metoprolol.     Re: CKD, she has been seeing a nephrologist in [place], [place] since 2022. She had 3 pregnancies without pre-eclampsia or DM. She denies history of RYAN, kidney stone. She can empty her bladder but is incontinent at times. Denies NSAIDs. Her appetite is \"still good.\"  Weight is stable the last few months. Denies daily headache, N/V, metallic taste. Sleeping fine.  She says she changed her mind about dialysis, would no longer want dialysis in the future.           ROS:    Review of Systems   Constitutional:  Negative for fever.   Respiratory:  Negative for shortness of breath.    Cardiovascular:  Negative for chest pain.   Gastrointestinal:  Negative for abdominal pain.   Genitourinary:  Negative for " dysuria and hematuria.   All other systems reviewed and are negative.                  Past Medical History:  Past Medical History:   Diagnosis Date    Arthritis     Blood transfusion without reported diagnosis     Cataract     Diabetes (HCC)     Kidney disease      Past Surgical History:   Procedure Laterality Date    BLADDER SLING FEMALE      CATARACT EXTRACTION WITH IOL          Current Outpatient Medications   Medication Sig Dispense Refill    furosemide (LASIX) 80 MG Tab Take 1 Tablet by mouth every day. 90 Tablet 3    metoprolol SR (TOPROL XL) 50 MG TABLET SR 24 HR Take 1 Tablet by mouth every day. 90 Tablet 3    atorvastatin (LIPITOR) 80 MG tablet Take 1 Tablet by mouth every day. 90 Tablet 3    losartan (COZAAR) 100 MG Tab Take 1 Tablet by mouth every day. 90 Tablet 3    dapagliflozin propanediol (FARXIGA) 5 MG Tab Take 1 Tablet by mouth every day. 90 Tablet 3    BIOTIN 5000 PO Take  by mouth.      Cholecalciferol (D3-1000) 1000 UNIT Cap Take  by mouth.      Cyanocobalamin (B-12) 1000 MCG Cap Take  by mouth.      Omega-3 Fatty Acids (OMEGA 3 PO) Take  by mouth.      Multiple Vitamins-Minerals (WOMENS MULTIVITAMIN PO) Take  by mouth.       No current facility-administered medications for this visit.               Allergies:   No Known Allergies     Current Outpatient Medications:   (Not in a hospital admission)            Physical Exam:   Vitals obtained by patient:  Vitals:    02/06/25 1519   BP: 128/65   BP Location: Left arm   Patient Position: Sitting   BP Cuff Size: Adult   Pulse: 79   Temp: 36.8 °C (98.3 °F)   TempSrc: Temporal   SpO2: 95%   Weight: 54.4 kg (120 lb)   Height: 1.524 m (5')         Physical Exam:  Constitutional: Alert, no distress, well-groomed.  Skin: No rashes in visible areas.  Eye: Round. Conjunctiva clear, lids normal. No icterus.   ENMT: Lips pink without lesions, good dentition, moist mucous membranes. Phonation normal.  Neck: No masses, no thyromegaly. Moves freely without  pain.  Respiratory: Unlabored respiratory effort, no cough or audible wheeze  Psych: Alert and oriented x3, normal affect and mood.          Imaging reviewed & Visualized by me:  DIAGNOSTIC IMAGING REVIEWED AND/OR INTERPRETED BY ME:        Laboratory:   Recent Labs     09/05/24  1329 11/21/24  1328 01/29/25  1329   ALBUMIN 4.2 4.3 4.2   SODIUM 135 139 140   POTASSIUM 5.2 4.7 4.8   CHLORIDE 100 103 102   CO2 21 22 20   BUN 63* 72* 55*   CREATININE 4.19* 4.75* 4.62*   PHOSPHORUS 4.8* 5.1* 6.0*     Lab Results   Component Value Date/Time    WBC 7.0 01/29/2025 01:29 PM    RBC 3.06 (L) 01/29/2025 01:29 PM    HEMOGLOBIN 9.7 (L) 01/29/2025 01:29 PM    HEMATOCRIT 29.3 (L) 01/29/2025 01:29 PM    MCV 95.8 01/29/2025 01:29 PM    MCH 31.7 01/29/2025 01:29 PM    MCHC 33.1 01/29/2025 01:29 PM    MPV 9.6 01/29/2025 01:29 PM        URINALYSIS:  Lab Results   Component Value Date/Time    COLORURINE Yellow 01/29/2025 1329    CLARITY Clear 01/29/2025 1329    SPECGRAVITY 1.014 01/29/2025 1329    PHURINE 6.5 01/29/2025 1329    KETONES Negative 01/29/2025 1329    PROTEINURIN 300 (A) 01/29/2025 1329    BILIRUBINUR Negative 01/29/2025 1329    UROBILU 0.2 01/29/2025 1329    NITRITE Negative 01/29/2025 1329    LEUKESTERAS Trace (A) 01/29/2025 1329    OCCULTBLOOD Negative 01/29/2025 1329       UPC  Lab Results   Component Value Date/Time    TOTPROTUR 141.0 (H) 01/29/2025 1329      Lab Results   Component Value Date/Time    CREATININEU 46.95 01/29/2025 1329              Assessment and Plan:   Ivette Castelan is a 89 y.o. female with DM2, HTN, CKD5 who presents today for follow-up.    1. CKD (chronic kidney disease) stage 5  -Creatinine and GFR are stable within stage V CKD.   Her underlying CKD likely from diabetic nephropathy, hypertension, age-related kidney decline.  I recommend avoiding NSAIDs and other nephrotoxins.  I recommend a low-sodium diet.  I explained the importance of glycemic and blood pressure control to help slow CKD progression.    I recommend maintaining Farxiga and losartan for long-term kidney protection.  She remains at high risk of CKD progression to ESRD with her near nephrotic range proteinuria.  As of February 2025, she says she no longer thinks she would want dialysis if her kidneys fail in the future.  She wants to enjoy quality of life, and does not think that dialysis would provide quality of life.  I acknowledged her feelings and agreed that it is reasonable to choose not to do dialysis.  Fortunately, there remains no acute need for dialysis or hospice at this time, as she has no uremic symptoms, and no major metabolic abnormalities, and no volume overload (within the confines of virtual visit examination).  Patient was instructed of the uremic symptoms she would experience if her kidneys are failing, which would trigger me to order hospice referral.        KFRE 2-Year: 59.7% at 1/29/2025  1:29 PM  Calculated from:  Serum Creatinine: 4.62 mg/dL at 1/29/2025  1:29 PM  Urine Albumin Creatinine Ratio: 1,821 mg/g at 1/29/2025  1:29 PM  Age: 89 years  Sex: Female at 1/29/2025  1:29 PM  Has CKD-3 to CKD-5: Yes     KFRE 5-Year: 94.2% at 1/29/2025  1:29 PM  Calculated from:  Serum Creatinine: 4.62 mg/dL at 1/29/2025  1:29 PM  Urine Albumin Creatinine Ratio: 1,821 mg/g at 1/29/2025  1:29 PM  Age: 89 years  Sex: Female at 1/29/2025  1:29 PM  Has CKD-3 to CKD-5: Yes      2. Type 2 diabetes mellitus with diabetic nephropathy, without long-term current use of insulin (HCC)  -Uncontrolled with hyperglycemia, per patient.  I recommend maintaining Farxiga and losartan for long-term kidney protection.  Avoid metformin with GFR less than 30.    3. Essential hypertension  -Fairly well-controlled.  Continue current regimen with metoprolol, losartan, Lasix 80 mg daily.  If hypertension and/or lower extremity edema worsen, Lasix dose should be increased from 80 mg to 160 mg daily.    4. Anemia, unspecified type  -Persistent, slightly worsening.   Patient is more interested in quality of life, so I will only consider referral for ALYCE injections if hemoglobin remains less than 10 and if patient has symptoms of anemia such as fatigue.  She is not iron deficient.    5. Hyperparathyroidism  -Persistent, as expected for her degree of CKD.  No acute need for calcitriol.        Return to clinic in 3 months with pre-clinic      Franko Green MD  Nephrology  [facility]

## 2025-05-08 ENCOUNTER — HOSPITAL ENCOUNTER (OUTPATIENT)
Dept: LAB | Facility: MEDICAL CENTER | Age: 89
End: 2025-05-08
Attending: INTERNAL MEDICINE
Payer: COMMERCIAL

## 2025-05-08 DIAGNOSIS — D63.8 ANEMIA OF CHRONIC DISEASE: ICD-10-CM

## 2025-05-08 DIAGNOSIS — Z87.440 HISTORY OF UTI: ICD-10-CM

## 2025-05-08 DIAGNOSIS — N18.5 CKD (CHRONIC KIDNEY DISEASE) STAGE 5, GFR LESS THAN 15 ML/MIN (HCC): ICD-10-CM

## 2025-05-08 DIAGNOSIS — I10 ESSENTIAL HYPERTENSION: Chronic | ICD-10-CM

## 2025-05-08 DIAGNOSIS — N25.81 HYPERPARATHYROIDISM DUE TO RENAL INSUFFICIENCY (HCC): ICD-10-CM

## 2025-05-08 DIAGNOSIS — E11.21 TYPE 2 DIABETES MELLITUS WITH DIABETIC NEPHROPATHY, WITHOUT LONG-TERM CURRENT USE OF INSULIN (HCC): Chronic | ICD-10-CM

## 2025-05-08 LAB
25(OH)D3 SERPL-MCNC: 64 NG/ML (ref 30–100)
ALBUMIN SERPL BCP-MCNC: 3.9 G/DL (ref 3.2–4.9)
ANION GAP SERPL CALC-SCNC: 13 MMOL/L (ref 7–16)
BUN SERPL-MCNC: 62 MG/DL (ref 8–22)
CALCIUM SERPL-MCNC: 8.9 MG/DL (ref 8.5–10.5)
CHLORIDE SERPL-SCNC: 106 MMOL/L (ref 96–112)
CO2 SERPL-SCNC: 18 MMOL/L (ref 20–33)
CREAT SERPL-MCNC: 5.15 MG/DL (ref 0.5–1.4)
ERYTHROCYTE [DISTWIDTH] IN BLOOD BY AUTOMATED COUNT: 43.2 FL (ref 35.9–50)
FASTING STATUS PATIENT QL REPORTED: NORMAL
FERRITIN SERPL-MCNC: 281 NG/ML (ref 10–291)
GFR SERPLBLD CREATININE-BSD FMLA CKD-EPI: 8 ML/MIN/1.73 M 2
GLUCOSE SERPL-MCNC: 204 MG/DL (ref 65–99)
HCT VFR BLD AUTO: 29.8 % (ref 37–47)
HGB BLD-MCNC: 9.5 G/DL (ref 12–16)
IRON SATN MFR SERPL: 27 % (ref 15–55)
IRON SERPL-MCNC: 62 UG/DL (ref 40–170)
MCH RBC QN AUTO: 30.6 PG (ref 27–33)
MCHC RBC AUTO-ENTMCNC: 31.9 G/DL (ref 32.2–35.5)
MCV RBC AUTO: 96.1 FL (ref 81.4–97.8)
PHOSPHATE SERPL-MCNC: 4.8 MG/DL (ref 2.5–4.5)
PLATELET # BLD AUTO: 279 K/UL (ref 164–446)
PMV BLD AUTO: 9.6 FL (ref 9–12.9)
POTASSIUM SERPL-SCNC: 4.6 MMOL/L (ref 3.6–5.5)
PTH-INTACT SERPL-MCNC: 284 PG/ML (ref 14–72)
RBC # BLD AUTO: 3.1 M/UL (ref 4.2–5.4)
SODIUM SERPL-SCNC: 137 MMOL/L (ref 135–145)
TIBC SERPL-MCNC: 228 UG/DL (ref 250–450)
UIBC SERPL-MCNC: 166 UG/DL (ref 110–370)
WBC # BLD AUTO: 6.4 K/UL (ref 4.8–10.8)

## 2025-05-08 PROCEDURE — 36415 COLL VENOUS BLD VENIPUNCTURE: CPT

## 2025-05-08 PROCEDURE — 80048 BASIC METABOLIC PNL TOTAL CA: CPT

## 2025-05-08 PROCEDURE — 82040 ASSAY OF SERUM ALBUMIN: CPT

## 2025-05-08 PROCEDURE — 83970 ASSAY OF PARATHORMONE: CPT

## 2025-05-08 PROCEDURE — 82306 VITAMIN D 25 HYDROXY: CPT

## 2025-05-08 PROCEDURE — 83540 ASSAY OF IRON: CPT

## 2025-05-08 PROCEDURE — 84100 ASSAY OF PHOSPHORUS: CPT

## 2025-05-08 PROCEDURE — 85027 COMPLETE CBC AUTOMATED: CPT

## 2025-05-08 PROCEDURE — 83550 IRON BINDING TEST: CPT

## 2025-05-08 PROCEDURE — 82728 ASSAY OF FERRITIN: CPT

## 2025-05-09 ENCOUNTER — HOSPITAL ENCOUNTER (OUTPATIENT)
Facility: MEDICAL CENTER | Age: 89
End: 2025-05-09
Attending: INTERNAL MEDICINE
Payer: COMMERCIAL

## 2025-05-09 LAB
APPEARANCE UR: CLEAR
BACTERIA #/AREA URNS HPF: ABNORMAL /HPF
BILIRUB UR QL STRIP.AUTO: NEGATIVE
CASTS URNS QL MICRO: ABNORMAL /LPF (ref 0–2)
COLOR UR: YELLOW
CREAT UR-MCNC: 83.7 MG/DL
EPITHELIAL CELLS 1715: ABNORMAL /HPF (ref 0–5)
GLUCOSE UR STRIP.AUTO-MCNC: 250 MG/DL
KETONES UR STRIP.AUTO-MCNC: NEGATIVE MG/DL
LEUKOCYTE ESTERASE UR QL STRIP.AUTO: ABNORMAL
MICRO URNS: ABNORMAL
NITRITE UR QL STRIP.AUTO: NEGATIVE
PH UR STRIP.AUTO: 5.5 [PH] (ref 5–8)
PROT UR QL STRIP: 100 MG/DL
PROT UR-MCNC: 98.1 MG/DL (ref 0–15)
PROT/CREAT UR: 1172 MG/G (ref 10–107)
RBC # URNS HPF: ABNORMAL /HPF (ref 0–2)
RBC UR QL AUTO: NEGATIVE
SP GR UR STRIP.AUTO: 1.02
UROBILINOGEN UR STRIP.AUTO-MCNC: 0.2 EU/DL
WBC #/AREA URNS HPF: ABNORMAL /HPF

## 2025-05-09 PROCEDURE — 81001 URINALYSIS AUTO W/SCOPE: CPT

## 2025-05-09 PROCEDURE — 84156 ASSAY OF PROTEIN URINE: CPT

## 2025-05-09 PROCEDURE — 82043 UR ALBUMIN QUANTITATIVE: CPT

## 2025-05-09 PROCEDURE — 82570 ASSAY OF URINE CREATININE: CPT

## 2025-05-10 LAB
CREAT UR-MCNC: 85.2 MG/DL
MICROALBUMIN UR-MCNC: 57.7 MG/DL
MICROALBUMIN/CREAT UR: 677 MG/G (ref 0–30)

## 2025-05-15 ENCOUNTER — TELEMEDICINE (OUTPATIENT)
Dept: NEPHROLOGY | Facility: MEDICAL CENTER | Age: 89
End: 2025-05-15
Payer: COMMERCIAL

## 2025-05-15 VITALS — HEIGHT: 60 IN | BODY MASS INDEX: 23.16 KG/M2 | WEIGHT: 118 LBS

## 2025-05-15 DIAGNOSIS — N25.81 HYPERPARATHYROIDISM DUE TO RENAL INSUFFICIENCY (HCC): ICD-10-CM

## 2025-05-15 DIAGNOSIS — I10 ESSENTIAL HYPERTENSION: Chronic | ICD-10-CM

## 2025-05-15 DIAGNOSIS — N18.5 CKD (CHRONIC KIDNEY DISEASE) STAGE 5, GFR LESS THAN 15 ML/MIN (HCC): Primary | ICD-10-CM

## 2025-05-15 DIAGNOSIS — E11.21 TYPE 2 DIABETES MELLITUS WITH DIABETIC NEPHROPATHY, WITHOUT LONG-TERM CURRENT USE OF INSULIN (HCC): Chronic | ICD-10-CM

## 2025-05-15 DIAGNOSIS — D63.8 ANEMIA OF CHRONIC DISEASE: ICD-10-CM

## 2025-05-15 PROCEDURE — 99215 OFFICE O/P EST HI 40 MIN: CPT | Mod: 95 | Performed by: INTERNAL MEDICINE

## 2025-05-15 ASSESSMENT — ENCOUNTER SYMPTOMS
FEVER: 0
ABDOMINAL PAIN: 0
SHORTNESS OF BREATH: 0

## 2025-05-15 NOTE — PROGRESS NOTES
"Chief Complaint:   No chief complaint on file.  Follow up CKD    This evaluation was conducted via Teams using secure and encrypted videoconferencing technology. The patient was in their home in the [facility].    The patient's identity was confirmed and verbal consent was obtained for this virtual visit.         History of Present Illness:    Ivette Castelan is a 89 y.o. female with DM2, HTN, CKD5 who presents today for follow-up.    She moved from [place] to [place], [place]  in June, 2022 in order to be closer to family.     Complains of runny nose and cough. She hasn't tried any medicines to help.     Re: DM2, diagnosed 1975 when she was overweight. Patient has had microalbuminuria since at least 2010, and macroalbuminuria since 2016. Patient has seen an eye doctor and does not have retinopathy. She has had periods of good control of DM and some periods of poor control. She remains on farxiga, denies yeast infections. Sugar is \"not too good and not too bad.\"     Re: HTN, diagnosed around 1975. BP control over the years has been fairly controlled. She hasn't been checking BP lately. Denies LE edema. She remains on lasix 80mg daily and she feels diuretic effect.  Remains on losartan and metoprolol.     Re: CKD, she has been seeing a nephrologist in [place], [place] since 2022. She had 3 pregnancies without pre-eclampsia or DM. She denies history of RYAN, kidney stone. She is able to empty her bladder but is incontinent at times. Denies NSAIDs. Her appetite is \"very good.\"  Weight is stable the last few months. Denies daily headache, N/V, metallic taste. Sleeping is \"no problem.\" Energy level is \"I get tired easily.\"  She Continues to say she would not want dialysis in the future.           ROS:    Review of Systems   Constitutional:  Negative for fever.   Respiratory:  Negative for shortness of breath.    Cardiovascular:  Negative for chest pain.   Gastrointestinal:  Negative for abdominal pain.   Genitourinary:  Negative " for dysuria and hematuria.   All other systems reviewed and are negative.                  Past Medical History:  Past Medical History:   Diagnosis Date    Arthritis     Blood transfusion without reported diagnosis     Cataract     Diabetes (HCC)     Kidney disease      Past Surgical History:   Procedure Laterality Date    BLADDER SLING FEMALE      CATARACT EXTRACTION WITH IOL          Current Outpatient Medications   Medication Sig Dispense Refill    furosemide (LASIX) 80 MG Tab Take 1 Tablet by mouth every day. 90 Tablet 3    metoprolol SR (TOPROL XL) 50 MG TABLET SR 24 HR Take 1 Tablet by mouth every day. 90 Tablet 3    atorvastatin (LIPITOR) 80 MG tablet Take 1 Tablet by mouth every day. 90 Tablet 3    losartan (COZAAR) 100 MG Tab Take 1 Tablet by mouth every day. 90 Tablet 3    dapagliflozin propanediol (FARXIGA) 5 MG Tab Take 1 Tablet by mouth every day. 90 Tablet 3    BIOTIN 5000 PO Take  by mouth.      Cholecalciferol (D3-1000) 1000 UNIT Cap Take  by mouth.      Cyanocobalamin (B-12) 1000 MCG Cap Take  by mouth.      Omega-3 Fatty Acids (OMEGA 3 PO) Take  by mouth.      Multiple Vitamins-Minerals (WOMENS MULTIVITAMIN PO) Take  by mouth.       No current facility-administered medications for this visit.               Allergies:   No Known Allergies     Current Outpatient Medications:   (Not in a hospital admission)            Physical Exam:   Vitals obtained by patient:  Vitals:    05/15/25 1515   Weight: 53.5 kg (118 lb)   Height: 1.524 m (5')         Physical Exam:  Constitutional: Alert, no distress, well-groomed.  Skin: No rashes in visible areas.  Eye: Round. Conjunctiva clear, lids normal. No icterus.   ENMT: Lips pink without lesions, good dentition, moist mucous membranes. Phonation normal.  Neck: No masses, no thyromegaly. Moves freely without pain.  Respiratory: Unlabored respiratory effort, no cough or audible wheeze  Psych: Alert and oriented x3, normal affect and mood.          Imaging reviewed &  Visualized by me:  DIAGNOSTIC IMAGING REVIEWED AND/OR INTERPRETED BY ME:        Laboratory:   Recent Labs     11/21/24  1328 01/29/25  1329 05/08/25  1305   ALBUMIN 4.3 4.2 3.9   SODIUM 139 140 137   POTASSIUM 4.7 4.8 4.6   CHLORIDE 103 102 106   CO2 22 20 18*   BUN 72* 55* 62*   CREATININE 4.75* 4.62* 5.15*   PHOSPHORUS 5.1* 6.0* 4.8*     Lab Results   Component Value Date/Time    WBC 6.4 05/08/2025 01:05 PM    RBC 3.10 (L) 05/08/2025 01:05 PM    HEMOGLOBIN 9.5 (L) 05/08/2025 01:05 PM    HEMATOCRIT 29.8 (L) 05/08/2025 01:05 PM    MCV 96.1 05/08/2025 01:05 PM    MCH 30.6 05/08/2025 01:05 PM    MCHC 31.9 (L) 05/08/2025 01:05 PM    MPV 9.6 05/08/2025 01:05 PM        URINALYSIS:  Lab Results   Component Value Date/Time    COLORURINE Yellow 01/29/2025 1329    CLARITY Clear 01/29/2025 1329    SPECGRAVITY 1.014 01/29/2025 1329    PHURINE 6.5 01/29/2025 1329    KETONES Negative 01/29/2025 1329    PROTEINURIN 300 (A) 01/29/2025 1329    BILIRUBINUR Negative 01/29/2025 1329    UROBILU 0.2 01/29/2025 1329    NITRITE Negative 01/29/2025 1329    LEUKESTERAS Trace (A) 01/29/2025 1329    OCCULTBLOOD Negative 01/29/2025 1329       UPC  Lab Results   Component Value Date/Time    TOTPROTUR 141.0 (H) 01/29/2025 1329      Lab Results   Component Value Date/Time    CREATININEU 46.95 01/29/2025 1329              Assessment and Plan:   Ivette Castelan is a 89 y.o. female with DM2, HTN, CKD5 who presents today for follow-up.    1. CKD (chronic kidney disease) stage 5  -Creatinine and GFR are worsening within stage V CKD.   Her underlying CKD likely from diabetic nephropathy, hypertension, age-related kidney decline.  Patient has no uremic symptoms, but does have increasing fatigue, likely due to anemia of chronic disease.  Her goals are to travel as long as possible to see family, to stay comfortable, and to avoid dialysis.  I recommend referral to hospice as the best way to help her achieve these goals.  Fortunately, she does not seem to need  not much symptom management at this time, but she will likely require more symptom management as her CKD progresses.      KFRE 2-Year: 47.8% at 5/9/2025  8:00 AM  Calculated from:  Serum Creatinine: 5.15 mg/dL at 5/8/2025  1:05 PM  Urine Albumin Creatinine Ratio: 677 mg/g at 5/9/2025  8:00 AM  Age: 89 years  Sex: Female at 5/9/2025  8:00 AM  Has CKD-3 to CKD-5: Yes     KFRE 5-Year: 86.9% at 5/9/2025  8:00 AM  Calculated from:  Serum Creatinine: 5.15 mg/dL at 5/8/2025  1:05 PM  Urine Albumin Creatinine Ratio: 677 mg/g at 5/9/2025  8:00 AM  Age: 89 years  Sex: Female at 5/9/2025  8:00 AM  Has CKD-3 to CKD-5: Yes      2. Type 2 diabetes mellitus with diabetic nephropathy, without long-term current use of insulin (HCC)  - Uncontrolled with hyperglycemia on labs.  The patient's goals are to enjoy her life, travel to visit family, and avoid dialysis.  I recommend referral to hospice to help manage symptoms and reduce medication burden.    3. Essential hypertension  - Patient has not been checking blood pressure at home.  She wishes to reduce her medication burden.  I recommend referral to hospice to help reduce medication burden.    4. Anemia, unspecified type  - Slightly worsening, due to anemia of chronic disease from advanced CKD.  Patient does not wish to travel to [place] for weekly Epogen injections.  I recommend referral to hospice.    5. Hyperparathyroidism  - Persistent, due to her advanced CKD.  No need for calcitriol or other medications.      As the patient does have worsening fatigue, worsening CKD, and I believe her life expectancy to be 6 months or less, I recommend referral to hospice.  Will discharge from nephrology clinic.  Please refer back to nephrology if needed in the future.  It has been a pleasure taking care of Ms. Castelan.      Franko Green MD  Nephrology  [facility]

## 2025-08-14 RX ORDER — FUROSEMIDE 80 MG/1
80 TABLET ORAL DAILY
Qty: 90 TABLET | Refills: 0 | Status: SHIPPED | OUTPATIENT
Start: 2025-08-14